# Patient Record
Sex: FEMALE | Race: WHITE | Employment: OTHER | URBAN - NONMETROPOLITAN AREA
[De-identification: names, ages, dates, MRNs, and addresses within clinical notes are randomized per-mention and may not be internally consistent; named-entity substitution may affect disease eponyms.]

---

## 2017-05-01 ENCOUNTER — HOSPITAL ENCOUNTER (OUTPATIENT)
Age: 82
Setting detail: SPECIMEN
Discharge: HOME | End: 2017-05-01
Payer: MEDICARE

## 2017-05-01 LAB — TSH SERPL DL<=0.05 MIU/L-ACNC: 1.13 MIU/L (ref 0.3–5)

## 2017-05-01 PROCEDURE — 84443 ASSAY THYROID STIM HORMONE: CPT

## 2017-05-01 PROCEDURE — 36415 COLL VENOUS BLD VENIPUNCTURE: CPT

## 2017-10-21 ENCOUNTER — APPOINTMENT (OUTPATIENT)
Dept: GENERAL RADIOLOGY | Age: 82
DRG: 872 | End: 2017-10-21
Payer: MEDICARE

## 2017-10-21 ENCOUNTER — HOSPITAL ENCOUNTER (INPATIENT)
Age: 82
LOS: 3 days | Discharge: NURSING, REHAB OR LONG-TERM CARE | DRG: 872 | End: 2017-10-25
Attending: FAMILY MEDICINE | Admitting: INTERNAL MEDICINE
Payer: MEDICARE

## 2017-10-21 DIAGNOSIS — R73.9 HYPERGLYCEMIA: Primary | ICD-10-CM

## 2017-10-21 LAB
-: ABNORMAL
ABSOLUTE EOS #: 0.1 K/UL (ref 0–0.4)
ABSOLUTE IMMATURE GRANULOCYTE: ABNORMAL K/UL (ref 0–0.3)
ABSOLUTE LYMPH #: 1.8 K/UL (ref 1–4.8)
ABSOLUTE MONO #: 0.4 K/UL (ref 0–1)
ALBUMIN SERPL-MCNC: 3.4 G/DL (ref 3.5–5.2)
ALBUMIN/GLOBULIN RATIO: ABNORMAL (ref 1–2.5)
ALP BLD-CCNC: 96 U/L (ref 35–104)
ALT SERPL-CCNC: 16 U/L (ref 5–33)
AMORPHOUS: ABNORMAL
AMYLASE: 52 U/L (ref 28–100)
ANION GAP SERPL CALCULATED.3IONS-SCNC: 13 MMOL/L (ref 9–17)
AST SERPL-CCNC: 12 U/L
BACTERIA: ABNORMAL
BASOPHILS # BLD: 0 %
BASOPHILS ABSOLUTE: 0 K/UL (ref 0–0.2)
BILIRUB SERPL-MCNC: 0.51 MG/DL (ref 0.3–1.2)
BILIRUBIN URINE: NEGATIVE
BUN BLDV-MCNC: 35 MG/DL (ref 8–23)
BUN/CREAT BLD: 21 (ref 9–20)
CALCIUM SERPL-MCNC: 9.7 MG/DL (ref 8.6–10.4)
CASTS UA: ABNORMAL /LPF
CHLORIDE BLD-SCNC: 93 MMOL/L (ref 98–107)
CO2: 26 MMOL/L (ref 20–31)
COLOR: YELLOW
COMMENT UA: ABNORMAL
CREAT SERPL-MCNC: 1.63 MG/DL (ref 0.5–0.9)
CRYSTALS, UA: ABNORMAL /HPF
DIFFERENTIAL TYPE: YES
EKG ATRIAL RATE: 113 BPM
EKG Q-T INTERVAL: 362 MS
EKG QRS DURATION: 74 MS
EKG QTC CALCULATION (BAZETT): 447 MS
EKG R AXIS: 27 DEGREES
EKG T AXIS: 65 DEGREES
EKG VENTRICULAR RATE: 92 BPM
EOSINOPHILS RELATIVE PERCENT: 1 %
EPITHELIAL CELLS UA: ABNORMAL /HPF
ESTIMATED AVERAGE GLUCOSE: 206 MG/DL
FIO2: 21
GFR AFRICAN AMERICAN: 36 ML/MIN
GFR NON-AFRICAN AMERICAN: 30 ML/MIN
GFR SERPL CREATININE-BSD FRML MDRD: ABNORMAL ML/MIN/{1.73_M2}
GFR SERPL CREATININE-BSD FRML MDRD: ABNORMAL ML/MIN/{1.73_M2}
GLUCOSE BLD-MCNC: 345 MG/DL (ref 65–99)
GLUCOSE BLD-MCNC: 395 MG/DL (ref 65–99)
GLUCOSE BLD-MCNC: 398 MG/DL (ref 65–99)
GLUCOSE BLD-MCNC: 569 MG/DL (ref 70–99)
GLUCOSE BLD-MCNC: 589 MG/DL
GLUCOSE URINE: ABNORMAL
HBA1C MFR BLD: 8.8 % (ref 4.8–5.9)
HCO3 VENOUS: 25.2 MMOL/L (ref 24–28)
HCT VFR BLD CALC: 35.5 % (ref 36–46)
HEMOGLOBIN: 11.8 G/DL (ref 12–16)
IMMATURE GRANULOCYTES: ABNORMAL %
INR BLD: 1
KETONES, URINE: NEGATIVE
LACTIC ACID, WHOLE BLOOD: ABNORMAL MMOL/L (ref 0.7–2.1)
LACTIC ACID, WHOLE BLOOD: ABNORMAL MMOL/L (ref 0.7–2.1)
LACTIC ACID: 2.9 MMOL/L (ref 0.5–2.2)
LACTIC ACID: 4.9 MMOL/L (ref 0.5–2.2)
LEUKOCYTE ESTERASE, URINE: ABNORMAL
LIPASE: 82 U/L (ref 13–60)
LYMPHOCYTES # BLD: 19 %
MAGNESIUM: 2.3 MG/DL (ref 1.6–2.6)
MCH RBC QN AUTO: 31.5 PG (ref 26–34)
MCHC RBC AUTO-ENTMCNC: 33.1 G/DL (ref 31–37)
MCV RBC AUTO: 95.3 FL (ref 80–100)
MONOCYTES # BLD: 4 %
MUCUS: ABNORMAL
NEGATIVE BASE EXCESS, VEN: ABNORMAL (ref 0–5)
NITRITE, URINE: NEGATIVE
O2 DEVICE/FLOW/%: ABNORMAL
O2 SAT, VEN: 37 % (ref 70–75)
OTHER OBSERVATIONS UA: ABNORMAL
PARTIAL THROMBOPLASTIN TIME: 26.6 SEC (ref 21–33)
PATIENT TEMP: ABNORMAL
PCO2, VEN: 44 MM HG (ref 41–51)
PDW BLD-RTO: 14.7 % (ref 12.1–15.2)
PH UA: 5 (ref 5–8)
PH VENOUS: 7.37 (ref 7.32–7.43)
PHOSPHORUS: 3.3 MG/DL (ref 2.6–4.5)
PLATELET # BLD: 281 K/UL (ref 140–450)
PLATELET ESTIMATE: ABNORMAL
PMV BLD AUTO: ABNORMAL FL (ref 6–12)
PO2, VEN: 23 MM HG (ref 25–40)
POC PCO2 TEMP: ABNORMAL MM HG
POC PH TEMP: ABNORMAL
POC PO2 TEMP: ABNORMAL MM HG
POSITIVE BASE EXCESS, VEN: 0 (ref 0–5)
POTASSIUM SERPL-SCNC: 5.4 MMOL/L (ref 3.7–5.3)
PROTEIN UA: ABNORMAL
PROTHROMBIN TIME: 9.8 SEC (ref 9–11.6)
RBC # BLD: 3.73 M/UL (ref 4–5.2)
RBC # BLD: ABNORMAL 10*6/UL
RBC UA: ABNORMAL /HPF (ref 0–2)
RENAL EPITHELIAL, UA: ABNORMAL /HPF
SEG NEUTROPHILS: 76 %
SEGMENTED NEUTROPHILS ABSOLUTE COUNT: 7.2 K/UL (ref 2.5–7)
SODIUM BLD-SCNC: 132 MMOL/L (ref 135–144)
SPECIFIC GRAVITY UA: 1.01 (ref 1–1.03)
TOTAL CK: 14 U/L (ref 26–192)
TOTAL CO2, VENOUS: 27 MMOL/L (ref 21–31)
TOTAL PROTEIN: 6.9 G/DL (ref 6.4–8.3)
TRICHOMONAS: ABNORMAL
TROPONIN INTERP: NORMAL
TROPONIN T: <0.03 NG/ML
TURBIDITY: ABNORMAL
URINE HGB: ABNORMAL
UROBILINOGEN, URINE: NORMAL
WBC # BLD: 9.6 K/UL (ref 3.5–11)
WBC # BLD: ABNORMAL 10*3/UL
WBC UA: ABNORMAL /HPF
YEAST: ABNORMAL

## 2017-10-21 PROCEDURE — 87040 BLOOD CULTURE FOR BACTERIA: CPT

## 2017-10-21 PROCEDURE — 83605 ASSAY OF LACTIC ACID: CPT

## 2017-10-21 PROCEDURE — 87077 CULTURE AEROBIC IDENTIFY: CPT

## 2017-10-21 PROCEDURE — 71010 XR CHEST PORTABLE: CPT

## 2017-10-21 PROCEDURE — 6360000002 HC RX W HCPCS: Performed by: FAMILY MEDICINE

## 2017-10-21 PROCEDURE — 82150 ASSAY OF AMYLASE: CPT

## 2017-10-21 PROCEDURE — 87086 URINE CULTURE/COLONY COUNT: CPT

## 2017-10-21 PROCEDURE — 36415 COLL VENOUS BLD VENIPUNCTURE: CPT

## 2017-10-21 PROCEDURE — 99285 EMERGENCY DEPT VISIT HI MDM: CPT

## 2017-10-21 PROCEDURE — 96375 TX/PRO/DX INJ NEW DRUG ADDON: CPT

## 2017-10-21 PROCEDURE — 83735 ASSAY OF MAGNESIUM: CPT

## 2017-10-21 PROCEDURE — 82803 BLOOD GASES ANY COMBINATION: CPT

## 2017-10-21 PROCEDURE — 84484 ASSAY OF TROPONIN QUANT: CPT

## 2017-10-21 PROCEDURE — 84100 ASSAY OF PHOSPHORUS: CPT

## 2017-10-21 PROCEDURE — 83036 HEMOGLOBIN GLYCOSYLATED A1C: CPT

## 2017-10-21 PROCEDURE — 85025 COMPLETE CBC W/AUTO DIFF WBC: CPT

## 2017-10-21 PROCEDURE — 85610 PROTHROMBIN TIME: CPT

## 2017-10-21 PROCEDURE — 85730 THROMBOPLASTIN TIME PARTIAL: CPT

## 2017-10-21 PROCEDURE — 6370000000 HC RX 637 (ALT 250 FOR IP): Performed by: INTERNAL MEDICINE

## 2017-10-21 PROCEDURE — 82550 ASSAY OF CK (CPK): CPT

## 2017-10-21 PROCEDURE — 93005 ELECTROCARDIOGRAM TRACING: CPT

## 2017-10-21 PROCEDURE — 81001 URINALYSIS AUTO W/SCOPE: CPT

## 2017-10-21 PROCEDURE — 2580000003 HC RX 258: Performed by: INTERNAL MEDICINE

## 2017-10-21 PROCEDURE — G0378 HOSPITAL OBSERVATION PER HR: HCPCS

## 2017-10-21 PROCEDURE — 96372 THER/PROPH/DIAG INJ SC/IM: CPT

## 2017-10-21 PROCEDURE — 87186 SC STD MICRODIL/AGAR DIL: CPT

## 2017-10-21 PROCEDURE — 6360000002 HC RX W HCPCS: Performed by: INTERNAL MEDICINE

## 2017-10-21 PROCEDURE — 82947 ASSAY GLUCOSE BLOOD QUANT: CPT

## 2017-10-21 PROCEDURE — 87205 SMEAR GRAM STAIN: CPT

## 2017-10-21 PROCEDURE — 2580000003 HC RX 258: Performed by: FAMILY MEDICINE

## 2017-10-21 PROCEDURE — 86403 PARTICLE AGGLUT ANTBDY SCRN: CPT

## 2017-10-21 PROCEDURE — 83690 ASSAY OF LIPASE: CPT

## 2017-10-21 PROCEDURE — 83930 ASSAY OF BLOOD OSMOLALITY: CPT

## 2017-10-21 PROCEDURE — 6370000000 HC RX 637 (ALT 250 FOR IP): Performed by: FAMILY MEDICINE

## 2017-10-21 PROCEDURE — 96365 THER/PROPH/DIAG IV INF INIT: CPT

## 2017-10-21 PROCEDURE — 80053 COMPREHEN METABOLIC PANEL: CPT

## 2017-10-21 PROCEDURE — 94760 N-INVAS EAR/PLS OXIMETRY 1: CPT

## 2017-10-21 RX ORDER — HYDROXYZINE HYDROCHLORIDE 25 MG/1
25 TABLET, FILM COATED ORAL EVERY 6 HOURS PRN
COMMUNITY

## 2017-10-21 RX ORDER — LISINOPRIL 10 MG/1
10 TABLET ORAL DAILY
COMMUNITY

## 2017-10-21 RX ORDER — ASPIRIN 81 MG/1
81 TABLET ORAL DAILY
Status: DISCONTINUED | OUTPATIENT
Start: 2017-10-21 | End: 2017-10-22 | Stop reason: SDUPTHER

## 2017-10-21 RX ORDER — NICOTINE POLACRILEX 4 MG
15 LOZENGE BUCCAL PRN
Status: DISCONTINUED | OUTPATIENT
Start: 2017-10-21 | End: 2017-10-25 | Stop reason: HOSPADM

## 2017-10-21 RX ORDER — LEVOTHYROXINE SODIUM 88 UG/1
88 TABLET ORAL DAILY
Status: DISCONTINUED | OUTPATIENT
Start: 2017-10-21 | End: 2017-10-25 | Stop reason: HOSPADM

## 2017-10-21 RX ORDER — FLUOXETINE 10 MG/1
10 CAPSULE ORAL DAILY
Status: DISCONTINUED | OUTPATIENT
Start: 2017-10-21 | End: 2017-10-22

## 2017-10-21 RX ORDER — CIPROFLOXACIN 2 MG/ML
400 INJECTION, SOLUTION INTRAVENOUS ONCE
Status: COMPLETED | OUTPATIENT
Start: 2017-10-21 | End: 2017-10-21

## 2017-10-21 RX ORDER — SODIUM CHLORIDE 9 MG/ML
INJECTION, SOLUTION INTRAVENOUS CONTINUOUS
Status: DISCONTINUED | OUTPATIENT
Start: 2017-10-21 | End: 2017-10-24

## 2017-10-21 RX ORDER — SODIUM CHLORIDE 0.9 % (FLUSH) 0.9 %
10 SYRINGE (ML) INJECTION PRN
Status: DISCONTINUED | OUTPATIENT
Start: 2017-10-21 | End: 2017-10-25 | Stop reason: HOSPADM

## 2017-10-21 RX ORDER — ACETAMINOPHEN 500 MG
1000 TABLET ORAL EVERY 8 HOURS PRN
COMMUNITY

## 2017-10-21 RX ORDER — DONEPEZIL HYDROCHLORIDE 23 MG/1
23 TABLET, FILM COATED ORAL NIGHTLY
Status: DISCONTINUED | OUTPATIENT
Start: 2017-10-21 | End: 2017-10-25 | Stop reason: HOSPADM

## 2017-10-21 RX ORDER — OXYBUTYNIN CHLORIDE 5 MG/1
5 TABLET ORAL NIGHTLY
Status: DISCONTINUED | OUTPATIENT
Start: 2017-10-21 | End: 2017-10-22 | Stop reason: SDUPTHER

## 2017-10-21 RX ORDER — DEXTROSE MONOHYDRATE 25 G/50ML
12.5 INJECTION, SOLUTION INTRAVENOUS PRN
Status: DISCONTINUED | OUTPATIENT
Start: 2017-10-21 | End: 2017-10-25 | Stop reason: HOSPADM

## 2017-10-21 RX ORDER — FAMOTIDINE 20 MG/1
20 TABLET, FILM COATED ORAL DAILY
Status: DISCONTINUED | OUTPATIENT
Start: 2017-10-21 | End: 2017-10-25 | Stop reason: HOSPADM

## 2017-10-21 RX ORDER — FERROUS SULFATE 325(65) MG
325 TABLET ORAL
Status: DISCONTINUED | OUTPATIENT
Start: 2017-10-22 | End: 2017-10-25 | Stop reason: HOSPADM

## 2017-10-21 RX ORDER — BISACODYL 10 MG
10 SUPPOSITORY, RECTAL RECTAL DAILY PRN
Status: ON HOLD | COMMUNITY
End: 2017-11-11

## 2017-10-21 RX ORDER — 0.9 % SODIUM CHLORIDE 0.9 %
250 INTRAVENOUS SOLUTION INTRAVENOUS ONCE
Status: DISCONTINUED | OUTPATIENT
Start: 2017-10-22 | End: 2017-10-25 | Stop reason: HOSPADM

## 2017-10-21 RX ORDER — ALBUTEROL SULFATE 2.5 MG/3ML
2.5 SOLUTION RESPIRATORY (INHALATION) EVERY 6 HOURS PRN
Status: DISCONTINUED | OUTPATIENT
Start: 2017-10-21 | End: 2017-10-25 | Stop reason: HOSPADM

## 2017-10-21 RX ORDER — ONDANSETRON 2 MG/ML
4 INJECTION INTRAMUSCULAR; INTRAVENOUS EVERY 6 HOURS PRN
Status: DISCONTINUED | OUTPATIENT
Start: 2017-10-21 | End: 2017-10-25 | Stop reason: HOSPADM

## 2017-10-21 RX ORDER — MEMANTINE HYDROCHLORIDE 10 MG/1
10 TABLET ORAL 2 TIMES DAILY
Status: DISCONTINUED | OUTPATIENT
Start: 2017-10-21 | End: 2017-10-25 | Stop reason: HOSPADM

## 2017-10-21 RX ORDER — SODIUM PHOSPHATE, DIBASIC AND SODIUM PHOSPHATE, MONOBASIC 7; 19 G/133ML; G/133ML
1 ENEMA RECTAL DAILY PRN
COMMUNITY

## 2017-10-21 RX ORDER — SODIUM CHLORIDE 0.9 % (FLUSH) 0.9 %
10 SYRINGE (ML) INJECTION EVERY 12 HOURS SCHEDULED
Status: DISCONTINUED | OUTPATIENT
Start: 2017-10-21 | End: 2017-10-25 | Stop reason: HOSPADM

## 2017-10-21 RX ORDER — DEXTROSE MONOHYDRATE 50 MG/ML
100 INJECTION, SOLUTION INTRAVENOUS PRN
Status: DISCONTINUED | OUTPATIENT
Start: 2017-10-21 | End: 2017-10-25 | Stop reason: HOSPADM

## 2017-10-21 RX ORDER — GUAIFENESIN 100 MG/5ML
10 SOLUTION ORAL EVERY 4 HOURS PRN
COMMUNITY

## 2017-10-21 RX ORDER — ACETAMINOPHEN 325 MG/1
650 TABLET ORAL EVERY 4 HOURS PRN
Status: DISCONTINUED | OUTPATIENT
Start: 2017-10-21 | End: 2017-10-22

## 2017-10-21 RX ORDER — BISACODYL 10 MG
10 SUPPOSITORY, RECTAL RECTAL DAILY PRN
Status: DISCONTINUED | OUTPATIENT
Start: 2017-10-21 | End: 2017-10-25 | Stop reason: HOSPADM

## 2017-10-21 RX ORDER — 0.9 % SODIUM CHLORIDE 0.9 %
1000 INTRAVENOUS SOLUTION INTRAVENOUS ONCE
Status: COMPLETED | OUTPATIENT
Start: 2017-10-21 | End: 2017-10-21

## 2017-10-21 RX ORDER — BACLOFEN 10 MG/1
5 TABLET ORAL 3 TIMES DAILY
Status: DISCONTINUED | OUTPATIENT
Start: 2017-10-21 | End: 2017-10-25 | Stop reason: HOSPADM

## 2017-10-21 RX ORDER — SODIUM CHLORIDE AND POTASSIUM CHLORIDE .9; .15 G/100ML; G/100ML
SOLUTION INTRAVENOUS ONCE
Status: COMPLETED | OUTPATIENT
Start: 2017-10-21 | End: 2017-10-21

## 2017-10-21 RX ORDER — DOCUSATE SODIUM 100 MG/1
100 CAPSULE, LIQUID FILLED ORAL 2 TIMES DAILY
Status: DISCONTINUED | OUTPATIENT
Start: 2017-10-21 | End: 2017-10-25 | Stop reason: HOSPADM

## 2017-10-21 RX ORDER — AMLODIPINE BESYLATE 5 MG/1
5 TABLET ORAL DAILY
COMMUNITY

## 2017-10-21 RX ORDER — OYSTER SHELL CALCIUM WITH VITAMIN D 500; 200 MG/1; [IU]/1
1 TABLET, FILM COATED ORAL DAILY
Status: DISCONTINUED | OUTPATIENT
Start: 2017-10-21 | End: 2017-10-22 | Stop reason: SDUPTHER

## 2017-10-21 RX ORDER — SIMETHICONE 80 MG
80 TABLET,CHEWABLE ORAL 2 TIMES DAILY PRN
COMMUNITY
End: 2017-11-11 | Stop reason: ALTCHOICE

## 2017-10-21 RX ADMIN — SODIUM CHLORIDE 1000 ML: 9 INJECTION, SOLUTION INTRAVENOUS at 17:42

## 2017-10-21 RX ADMIN — POTASSIUM CHLORIDE AND SODIUM CHLORIDE: 900; 150 INJECTION, SOLUTION INTRAVENOUS at 18:22

## 2017-10-21 RX ADMIN — OXYBUTYNIN CHLORIDE 5 MG: 5 TABLET ORAL at 21:50

## 2017-10-21 RX ADMIN — CIPROFLOXACIN 400 MG: 2 INJECTION, SOLUTION INTRAVENOUS at 18:07

## 2017-10-21 RX ADMIN — INSULIN GLARGINE 15 UNITS: 100 INJECTION, SOLUTION SUBCUTANEOUS at 21:59

## 2017-10-21 RX ADMIN — CEFTRIAXONE 1 G: 1 INJECTION, POWDER, FOR SOLUTION INTRAMUSCULAR; INTRAVENOUS at 21:42

## 2017-10-21 RX ADMIN — FOLIC ACID-PYRIDOXINE-CYANOCOBALAMIN TAB 2.5-25-2 MG 1 TABLET: 2.5-25-2 TAB at 21:51

## 2017-10-21 RX ADMIN — INSULIN LISPRO 5 UNITS: 100 INJECTION, SOLUTION INTRAVENOUS; SUBCUTANEOUS at 21:58

## 2017-10-21 RX ADMIN — INSULIN HUMAN 10 UNITS: 100 INJECTION, SOLUTION PARENTERAL at 17:42

## 2017-10-21 RX ADMIN — ENOXAPARIN SODIUM 30 MG: 30 INJECTION SUBCUTANEOUS at 21:41

## 2017-10-21 RX ADMIN — SODIUM CHLORIDE: 9 INJECTION, SOLUTION INTRAVENOUS at 20:48

## 2017-10-21 RX ADMIN — DONEPEZIL HYDROCHLORIDE 23 MG: 23 TABLET, FILM COATED ORAL at 21:49

## 2017-10-21 RX ADMIN — BACLOFEN 5 MG: 10 TABLET ORAL at 21:48

## 2017-10-21 RX ADMIN — DOCUSATE SODIUM 100 MG: 100 CAPSULE, LIQUID FILLED ORAL at 21:49

## 2017-10-21 ASSESSMENT — PAIN DESCRIPTION - PAIN TYPE: TYPE: CHRONIC PAIN

## 2017-10-21 ASSESSMENT — PAIN SCALES - GENERAL
PAINLEVEL_OUTOF10: 3
PAINLEVEL_OUTOF10: 0

## 2017-10-21 ASSESSMENT — PAIN DESCRIPTION - FREQUENCY: FREQUENCY: INTERMITTENT

## 2017-10-21 ASSESSMENT — PAIN DESCRIPTION - LOCATION: LOCATION: SHOULDER

## 2017-10-21 ASSESSMENT — PAIN DESCRIPTION - DESCRIPTORS: DESCRIPTORS: ACHING

## 2017-10-21 ASSESSMENT — PAIN DESCRIPTION - ORIENTATION: ORIENTATION: RIGHT;LEFT

## 2017-10-22 LAB
ANION GAP SERPL CALCULATED.3IONS-SCNC: 9 MMOL/L (ref 9–17)
BUN BLDV-MCNC: 24 MG/DL (ref 8–23)
BUN/CREAT BLD: 20 (ref 9–20)
CALCIUM SERPL-MCNC: 8.4 MG/DL (ref 8.6–10.4)
CHLORIDE BLD-SCNC: 102 MMOL/L (ref 98–107)
CO2: 24 MMOL/L (ref 20–31)
CREAT SERPL-MCNC: 1.22 MG/DL (ref 0.5–0.9)
GFR AFRICAN AMERICAN: 50 ML/MIN
GFR NON-AFRICAN AMERICAN: 41 ML/MIN
GFR SERPL CREATININE-BSD FRML MDRD: ABNORMAL ML/MIN/{1.73_M2}
GFR SERPL CREATININE-BSD FRML MDRD: ABNORMAL ML/MIN/{1.73_M2}
GLUCOSE BLD-MCNC: 211 MG/DL (ref 65–99)
GLUCOSE BLD-MCNC: 224 MG/DL (ref 65–99)
GLUCOSE BLD-MCNC: 283 MG/DL (ref 65–99)
GLUCOSE BLD-MCNC: 305 MG/DL (ref 65–99)
GLUCOSE BLD-MCNC: 311 MG/DL (ref 70–99)
HCT VFR BLD CALC: 32.7 % (ref 36–46)
HEMOGLOBIN: 10.5 G/DL (ref 12–16)
LACTIC ACID, WHOLE BLOOD: NORMAL MMOL/L (ref 0.7–2.1)
LACTIC ACID: 2 MMOL/L (ref 0.5–2.2)
MAGNESIUM: 1.9 MG/DL (ref 1.6–2.6)
MCH RBC QN AUTO: 30.9 PG (ref 26–34)
MCHC RBC AUTO-ENTMCNC: 32.3 G/DL (ref 31–37)
MCV RBC AUTO: 95.7 FL (ref 80–100)
PDW BLD-RTO: 15.2 % (ref 12.1–15.2)
PLATELET # BLD: 231 K/UL (ref 140–450)
PMV BLD AUTO: ABNORMAL FL (ref 6–12)
POTASSIUM SERPL-SCNC: 4.8 MMOL/L (ref 3.7–5.3)
RBC # BLD: 3.41 M/UL (ref 4–5.2)
SERUM OSMOLALITY: 324 MOSM/KG (ref 275–295)
SODIUM BLD-SCNC: 135 MMOL/L (ref 135–144)
WBC # BLD: 8.3 K/UL (ref 3.5–11)

## 2017-10-22 PROCEDURE — 82947 ASSAY GLUCOSE BLOOD QUANT: CPT

## 2017-10-22 PROCEDURE — 94761 N-INVAS EAR/PLS OXIMETRY MLT: CPT

## 2017-10-22 PROCEDURE — 6370000000 HC RX 637 (ALT 250 FOR IP): Performed by: INTERNAL MEDICINE

## 2017-10-22 PROCEDURE — 1200000000 HC SEMI PRIVATE

## 2017-10-22 PROCEDURE — 6360000002 HC RX W HCPCS: Performed by: INTERNAL MEDICINE

## 2017-10-22 PROCEDURE — 96372 THER/PROPH/DIAG INJ SC/IM: CPT

## 2017-10-22 PROCEDURE — 85027 COMPLETE CBC AUTOMATED: CPT

## 2017-10-22 PROCEDURE — 80048 BASIC METABOLIC PNL TOTAL CA: CPT

## 2017-10-22 PROCEDURE — 2580000003 HC RX 258: Performed by: INTERNAL MEDICINE

## 2017-10-22 PROCEDURE — 83735 ASSAY OF MAGNESIUM: CPT

## 2017-10-22 PROCEDURE — 36415 COLL VENOUS BLD VENIPUNCTURE: CPT

## 2017-10-22 PROCEDURE — 83605 ASSAY OF LACTIC ACID: CPT

## 2017-10-22 RX ORDER — OXYBUTYNIN CHLORIDE 5 MG/1
5 TABLET, EXTENDED RELEASE ORAL NIGHTLY
Status: DISCONTINUED | OUTPATIENT
Start: 2017-10-22 | End: 2017-10-25 | Stop reason: HOSPADM

## 2017-10-22 RX ORDER — INSULIN GLARGINE 100 [IU]/ML
15 INJECTION, SOLUTION SUBCUTANEOUS NIGHTLY
Status: DISCONTINUED | OUTPATIENT
Start: 2017-10-22 | End: 2017-10-22

## 2017-10-22 RX ORDER — ACETAMINOPHEN 500 MG
500 TABLET ORAL EVERY 4 HOURS PRN
Status: DISCONTINUED | OUTPATIENT
Start: 2017-10-22 | End: 2017-10-25 | Stop reason: HOSPADM

## 2017-10-22 RX ORDER — INSULIN GLARGINE 100 [IU]/ML
20 INJECTION, SOLUTION SUBCUTANEOUS NIGHTLY
Status: DISCONTINUED | OUTPATIENT
Start: 2017-10-22 | End: 2017-10-22

## 2017-10-22 RX ORDER — OXYBUTYNIN CHLORIDE 5 MG/1
5 TABLET, EXTENDED RELEASE ORAL NIGHTLY
COMMUNITY

## 2017-10-22 RX ORDER — ASPIRIN 81 MG/1
81 TABLET, CHEWABLE ORAL DAILY
Status: DISCONTINUED | OUTPATIENT
Start: 2017-10-22 | End: 2017-10-25 | Stop reason: HOSPADM

## 2017-10-22 RX ADMIN — MEMANTINE HYDROCHLORIDE 10 MG: 10 TABLET ORAL at 11:18

## 2017-10-22 RX ADMIN — BACLOFEN 5 MG: 10 TABLET ORAL at 13:55

## 2017-10-22 RX ADMIN — DOCUSATE SODIUM 100 MG: 100 CAPSULE, LIQUID FILLED ORAL at 20:53

## 2017-10-22 RX ADMIN — INSULIN LISPRO 6 UNITS: 100 INJECTION, SOLUTION INTRAVENOUS; SUBCUTANEOUS at 08:13

## 2017-10-22 RX ADMIN — FERROUS SULFATE TAB 325 MG (65 MG ELEMENTAL FE) 325 MG: 325 (65 FE) TAB at 10:04

## 2017-10-22 RX ADMIN — DONEPEZIL HYDROCHLORIDE 23 MG: 23 TABLET, FILM COATED ORAL at 20:53

## 2017-10-22 RX ADMIN — MEMANTINE HYDROCHLORIDE 10 MG: 10 TABLET ORAL at 20:53

## 2017-10-22 RX ADMIN — INSULIN LISPRO 4 UNITS: 100 INJECTION, SOLUTION INTRAVENOUS; SUBCUTANEOUS at 12:14

## 2017-10-22 RX ADMIN — OXYBUTYNIN CHLORIDE 5 MG: 5 TABLET, EXTENDED RELEASE ORAL at 20:53

## 2017-10-22 RX ADMIN — Medication 10 ML: at 21:00

## 2017-10-22 RX ADMIN — CEFTRIAXONE 1 G: 1 INJECTION, POWDER, FOR SOLUTION INTRAMUSCULAR; INTRAVENOUS at 20:53

## 2017-10-22 RX ADMIN — INSULIN LISPRO 4 UNITS: 100 INJECTION, SOLUTION INTRAVENOUS; SUBCUTANEOUS at 20:54

## 2017-10-22 RX ADMIN — DOCUSATE SODIUM 100 MG: 100 CAPSULE, LIQUID FILLED ORAL at 10:04

## 2017-10-22 RX ADMIN — BACLOFEN 5 MG: 10 TABLET ORAL at 20:53

## 2017-10-22 RX ADMIN — BACLOFEN 5 MG: 10 TABLET ORAL at 10:05

## 2017-10-22 RX ADMIN — ASPIRIN 81 MG 81 MG: 81 TABLET ORAL at 10:02

## 2017-10-22 RX ADMIN — ENOXAPARIN SODIUM 30 MG: 30 INJECTION SUBCUTANEOUS at 10:51

## 2017-10-22 RX ADMIN — LEVOTHYROXINE SODIUM 88 MCG: 88 TABLET ORAL at 10:02

## 2017-10-22 RX ADMIN — SODIUM CHLORIDE: 9 INJECTION, SOLUTION INTRAVENOUS at 15:11

## 2017-10-22 RX ADMIN — INSULIN LISPRO 4 UNITS: 100 INJECTION, SOLUTION INTRAVENOUS; SUBCUTANEOUS at 17:03

## 2017-10-22 RX ADMIN — FAMOTIDINE 20 MG: 20 TABLET ORAL at 10:02

## 2017-10-22 RX ADMIN — SODIUM CHLORIDE: 9 INJECTION, SOLUTION INTRAVENOUS at 04:54

## 2017-10-22 NOTE — PLAN OF CARE
Problem: Risk for Impaired Skin Integrity  Goal: Tissue integrity - skin and mucous membranes  Structural intactness and normal physiological function of skin and  mucous membranes. Outcome: Met This Shift  Skin intact. Coccyx slightly reddened. Pt kept off coccyx.

## 2017-10-22 NOTE — PROGRESS NOTES
Dr David Aguirre called for low blood pressures of 86/  39 and 89/41 automatic and 86/56 manual.  Order received for 250 ml over next 30 minutes.

## 2017-10-22 NOTE — H&P
History & Physical    Patient:  Olegario Fraire  YOB: 1925  Date of Service: 10/22/2017  MRN: [id number]   Acct:   [de-identified]   Primary Care Physician: Molly Mcneill MD    Chief Complaint:   Chief Complaint   Patient presents with    Hyperglycemia     not feeling well, sweating, shaky, incoherent per Pristine; not herself; FSBS read high, given 10 units novolog-still read high, then 5 units lantus, then 10 units novolog and still read high       History of Present Illness: The patient is a 80 y.o. female with multiple medical problems, resides at nursing home for the past 6-7 years, was brought to the emergency room for evaluation of decreased level of consciousness, shakes, poor oral intake, vomiting for the past 3 days and also higher level of glucose. The patient has a history of dementia, she is very confused and does not participate in interview. When asked about pain, nausea and shortness of breath, she nods her head \"no\". Her daughter Cheryl Belcher is present in the room and provided me with history. She states that the patient usually communicates with family members adequately. Last several days the patient was refusing food. On Thursday morning she vomited once and didn't eat much. The patient did not complain of any pain. She continued with poor oral intake and episodes of once or twice a day vomiting on Friday and Saturday. She became more somnolent and yesterday also developed shakes. Cheryl Belcher asked the nursing home staff to check on the monitor. She says blood pressure was normal, fingerstick glucose level was high and that prompted emergency room evaluation. The patient initially had stable vitals in the ER. She was afebrile, oxygenation was 98% on room air. Further workup revealed evidence of hyperglycemia with glucose level 569, urinalysis was consistent with UTI, chemistry panel revealed acute renal failure with BUN 35 and creatinine 1.63.   The patient had a sodium level of 132, potassium 5.4. She also had elevated lactic acid level 4.9. CBC revealed no leukocytosis. EKG revealed A. fib with heart rate 92. The patient was admitted for IV antibiotics and IV fluids. Overnight her blood pressure dropped and she was given normal saline 250 mL bolus. This morning her blood pressure is more stable. Hyperglycemia also improved after receiving IV insulin in ER. She had no episodes of vomiting since admission       Past Medical History:        Diagnosis Date    Anemia     [facility]ch[facility] obstructive [facility] disease) ([facility]     Dementia     Depression     Diabetes mellitus (HCC)     with neuropathy    Elevated myoglobin level     Hyperlipidemia     Hypertension     Leukocytosis     Occipital fracture ([facility] 06/2013    Skull fracture (HCC)     Thyroid disease     UTI (urinary tract infection)        Past Surgical History:        Procedure Laterality Date    HYSTERECTOMY      JOINT REPLACEMENT Left     knee    KNEE SURGERY      LUNG REMOVAL, PARTIAL         Home Medications:   No current facility-administered medications on file prior to encounter.       Current Outpatient Prescriptions on File Prior to Encounter   Medication Sig Dispense Refill    Calcium Carb-Cholecalciferol (CALCIUM-VITAMIN D) 600-400 MG-UNIT TABS Take 1 tablet by mouth daily      insulin aspart (NOVOLOG) 100 UNIT/ML injection vial Inject 5 Units into the skin 3 times daily (before meals)       docusate sodium (COLACE) 100 MG capsule Take 100 mg by mouth 2 times daily       Donepezil HCl (ARICEPT) 23 MG TABS tablet Take 23 mg by mouth nightly      ferrous sulfate 325 (65 FE) MG tablet Take 325 mg by mouth daily (with breakfast)      trimethoprim (TRIMPEX) 100 MG tablet Take 100 mg by mouth nightly       baclofen (LIORESAL) 10 MG tablet Take 5 mg by mouth 3 times daily      memantine (NAMENDA) 10 MG tablet Take 10 mg by mouth 2 times daily      aspirin 81 MG tablet Take 81 mg by droop noted, able to move extremities without difficulties    Review of Labs and Diagnostic Testing:    Recent Results (from the past 24 hour(s))   POCT glucose    Collection Time: 10/21/17  4:49 PM   Result Value Ref Range    Glucose 589 mg/dL   EKG 12 Lead    Collection Time: 10/21/17  5:11 PM   Result Value Ref Range    Ventricular Rate 92 BPM    Atrial Rate 113 BPM    QRS Duration 74 ms    Q-T Interval 362 ms    QTc Calculation (Bazett) 447 ms    R Axis 27 degrees    T Axis 65 degrees   APTT    Collection Time: 10/21/17  5:35 PM   Result Value Ref Range    PTT 26.6 21.0 - 33.0 sec   CBC Auto Differential    Collection Time: 10/21/17  5:35 PM   Result Value Ref Range    WBC 9.6 3.5 - 11.0 k/uL    RBC 3.73 (L) 4.0 - 5.2 m/uL    Hemoglobin 11.8 (L) 12.0 - 16.0 g/dL    Hematocrit 35.5 (L) 36 - 46 %    MCV 95.3 80 - 100 fL    MCH 31.5 26 - 34 pg    MCHC 33.1 31 - 37 g/dL    RDW 14.7 12.1 - 15.2 %    Platelets 321 478 - 933 k/uL    MPV NOT REPORTED 6.0 - 12.0 fL    Differential Type YES     Immature Granulocytes NOT REPORTED 0 %    Absolute Immature Granulocyte NOT REPORTED 0.00 - 0.30 k/uL    WBC Morphology NOT REPORTED     RBC Morphology NOT REPORTED     Platelet Estimate NOT REPORTED     Seg Neutrophils 76 %    Lymphocytes 19 %    Monocytes 4 %    Eosinophils % 1 %    Basophils 0 %    Segs Absolute 7.20 (H) 2.5 - 7.0 k/uL    Absolute Lymph # 1.80 1.0 - 4.8 k/uL    Absolute Mono # 0.40 0.0 - 1.0 k/uL    Absolute Eos # 0.10 0.0 - 0.4 k/uL    Basophils # 0.00 0.0 - 0.2 k/uL   Comprehensive Metabolic Panel    Collection Time: 10/21/17  5:35 PM   Result Value Ref Range    Glucose 569 (HH) 70 - 99 mg/dL    BUN 35 (H) 8 - 23 mg/dL    CREATININE 1.63 (H) 0.50 - 0.90 mg/dL    Bun/Cre Ratio 21 (H) 9 - 20    Calcium 9.7 8.6 - 10.4 mg/dL    Sodium 132 (L) 135 - 144 mmol/L    Potassium 5.4 (H) 3.7 - 5.3 mmol/L    Chloride 93 (L) 98 - 107 mmol/L    CO2 26 20 - 31 mmol/L    Anion Gap 13 9 - 17 mmol/L    Alkaline Phosphatase 96 35 - 104 U/L    ALT 16 5 - 33 U/L    AST 12 <32 U/L    Total Bilirubin 0.51 0.3 - 1.2 mg/dL    Total Protein 6.9 6.4 - 8.3 g/dL    Alb 3.4 (L) 3.5 - 5.2 g/dL    Albumin/Globulin Ratio NOT REPORTED 1.0 - 2.5    GFR Non-African American 30 (L) >60 mL/min    GFR  36 (L) >60 mL/min    GFR Comment          GFR Staging NOT REPORTED    Protime-INR    Collection Time: 10/21/17  5:35 PM   Result Value Ref Range    Protime 9.8 9.0 - 11.6 sec    INR 1.0    Amylase    Collection Time: 10/21/17  5:35 PM   Result Value Ref Range    Amylase 52 28 - 100 U/L   Lipase    Collection Time: 10/21/17  5:35 PM   Result Value Ref Range    Lipase 82 (H) 13 - 60 U/L   Urinalysis    Collection Time: 10/21/17  5:35 PM   Result Value Ref Range    Color, UA YELLOW YEL    Turbidity UA CLOUDY (A) CLEAR    Glucose, Ur 1000 mg/dL (A) NEG    Bilirubin Urine NEGATIVE NEG    Ketones, Urine NEGATIVE NEG    Specific Gravity, UA 1.010 1.005 - 1.030    Urine Hgb 3+ (A) NEG    pH, UA 5.0 5.0 - 8.0    Protein, UA 1+ (A) NEG    Urobilinogen, Urine Normal NORM    Nitrite, Urine NEGATIVE NEG    Leukocyte Esterase, Urine 3+ (A) NEG    Urinalysis Comments         Magnesium    Collection Time: 10/21/17  5:35 PM   Result Value Ref Range    Magnesium 2.3 1.6 - 2.6 mg/dL   Phosphorus    Collection Time: 10/21/17  5:35 PM   Result Value Ref Range    Phosphorus 3.3 2.6 - 4.5 mg/dL   Culture blood #1    Collection Time: 10/21/17  5:35 PM   Result Value Ref Range    Specimen Description . BLOOD     Special Requests NOT REPORTED     Culture NO GROWTH 13 HOURS     Culture       Performed at [facility] [id number] [place], [place]    Culture  ([phone]     Status Pending    Lactic acid, plasma    Collection Time: 10/21/17  5:35 PM   Result Value Ref Range    Lactic Acid 4.9 (H) 0.5 - 2.2 mmol/L    Lactic Acid, Whole Blood NOT REPORTED 0.7 - 2.1 mmol/L   Microscopic Urinalysis    Collection Time: 10/21/17  5:35 PM   Result Value Ref Range    -          WBC, UA LOADED 0 /HPF    RBC, UA NOT REPORTED 0 - 2 /HPF    Casts UA NOT REPORTED /LPF    Crystals UA NOT REPORTED NONE /HPF    Epithelial Cells UA NOT REPORTED /HPF    Renal Epithelial, Urine NOT REPORTED 0 /HPF    Bacteria, UA 3+ (A) NONE    Mucus, UA NOT REPORTED NONE    Trichomonas, UA NOT REPORTED NONE    Amorphous, UA NOT REPORTED NONE    Other Observations UA NOT REPORTED NREQ    Yeast, UA NOT REPORTED NONE   CK    Collection Time: 10/21/17  5:36 PM   Result Value Ref Range    Total CK 14 (L) 26 - 192 U/L   Troponin    Collection Time: 10/21/17  5:36 PM   Result Value Ref Range    Troponin T <0.03 <0.03 ng/mL    Troponin Interp         POC PANEL (G3)-EM    Collection Time: 10/21/17  5:58 PM   Result Value Ref Range    pH, Em 7.37 7.32 - 7.43    pCO2, Em 44 41 - 51 mm Hg    pO2, Em 23 (LL) 25 - 40 mm Hg    Total CO2, Venous 27 21 - 31 mmol/L    HCO3, Venous 25.2 24 - 28 mmol/L    Negative Base Excess, Em NOT REPORTED 0.0 - 5.0    Positive Base Excess, Em 0 0 - 5.0    O2 Sat, Em 37 (LL) 70 - 75 %    Pt Temp NOT REPORTED     O2 Device/Flow/% NOT REPORTED     FIO2 21.0     POC pH Temp NOT REPORTED     POC pCO2 Temp NOT REPORTED mm Hg    POC pO2 Temp NOT REPORTED mm Hg   Glucose, Whole Blood    Collection Time: 10/21/17  6:52 PM   Result Value Ref Range    POC Glucose 398 (H) 65 - 99 mg/dL   Glucose, Whole Blood    Collection Time: 10/21/17  8:08 PM   Result Value Ref Range    POC Glucose 345 (H) 65 - 99 mg/dL   Lactic Acid, Plasma    Collection Time: 10/21/17  9:09 PM   Result Value Ref Range    Lactic Acid 2.9 (H) 0.5 - 2.2 mmol/L    Lactic Acid, Whole Blood NOT REPORTED 0.7 - 2.1 mmol/L   Hemoglobin A1c    Collection Time: 10/21/17  9:09 PM   Result Value Ref Range    Hemoglobin A1C 8.8 (H) 4.8 - 5.9 %    Estimated Avg Glucose 206 mg/dL   Glucose, Whole Blood    Collection Time: 10/21/17  9:52 PM   Result Value Ref Range    POC Glucose 395 (H) 65 - 99 mg/dL   Basic metabolic panel

## 2017-10-22 NOTE — PROGRESS NOTES
Based on patients currect CrCl (21 ml/min) the recommended max dose of memantine is 5 mg twice daily.

## 2017-10-22 NOTE — ED PROVIDER NOTES
History Narrative    ** Merged History Encounter **            PHYSICAL EXAM    VITAL SIGNS: BP (!) 96/56   Pulse 70   Temp 98.6 °F (37 °C) (Oral)   Resp 18   Ht 5' 4\" (1.626 m)   Wt 163 lb 2 oz (74 kg)   SpO2 96%   BMI 28.00 kg/m²   Constitutional:  Patient appears lethargic. Eyes:  Normal  HENT:  Atraumatic, external ears normal, nose normal, oropharynx dry. . Neck- normal range of motion, no tenderness, supple. Respiratory:  No respiratory distress, normal breath sounds, no rales, no wheezing   Cardiovascular:  Normal rate, normal rhythm, no murmurs, no gallops, no rubs. Carotid exam Normal.  GI:  Soft, nondistended, normal bowel sounds, nontender, no organomegaly, no mass, no rebound, no guarding   Musculoskeletal:  No edema, no tenderness, no  deformities. Back- no tenderness   Integument:  Poor skin turgor, no rash   Neurologic:  Patient was oriented to person. Disoriented to time and place. Neuro exam was otherwise grossly intact. EKG    Atrial fibrillation. This is chronic. RADIOLOGY/PROCEDURES      Chest x-ray with no acute changes. ED COURSE & MEDICAL DECISION MAKING    Pertinent Labs & Imaging studies reviewed. (See chart for details)  Patient had a good response to treatment she received in the ER. She became more alert. I discussed patient with Dr. Jaswinder Montoya, and patient will be admitted for observation.    Labs Reviewed   CBC WITH AUTO DIFFERENTIAL - Abnormal; Notable for the following:        Result Value    RBC 3.73 (*)     Hemoglobin 11.8 (*)     Hematocrit 35.5 (*)     Segs Absolute 7.20 (*)     All other components within normal limits   COMPREHENSIVE METABOLIC PANEL - Abnormal; Notable for the following:     Glucose 569 (*)     BUN 35 (*)     CREATININE 1.63 (*)     Bun/Cre Ratio 21 (*)     Sodium 132 (*)     Potassium 5.4 (*)     Chloride 93 (*)     Alb 3.4 (*)     GFR Non- 30 (*)     GFR  36 (*)     All other components within normal limits LIPASE - Abnormal; Notable for the following:     Lipase 82 (*)     All other components within normal limits   URINALYSIS - Abnormal; Notable for the following:     Turbidity UA CLOUDY (*)     Glucose, Ur 1000 mg/dL (*)     Urine Hgb 3+ (*)     Protein, UA 1+ (*)     Leukocyte Esterase, Urine 3+ (*)     All other components within normal limits   LACTIC ACID, PLASMA - Abnormal; Notable for the following:     Lactic Acid 4.9 (*)     All other components within normal limits   CK - Abnormal; Notable for the following: Total CK 14 (*)     All other components within normal limits   MICROSCOPIC URINALYSIS - Abnormal; Notable for the following:     Bacteria, UA 3+ (*)     All other components within normal limits   POC PANEL (G3)-EM - Abnormal; Notable for the following:     pO2, Em 23 (*)     O2 Sat, Em 37 (*)     All other components within normal limits   GLUCOSE, WHOLE BLOOD - Abnormal; Notable for the following:     POC Glucose 398 (*)     All other components within normal limits   GLUCOSE, WHOLE BLOOD - Abnormal; Notable for the following:     POC Glucose 345 (*)     All other components within normal limits   LACTIC ACID, PLASMA - Abnormal; Notable for the following:     Lactic Acid 2.9 (*)     All other components within normal limits   HEMOGLOBIN A1C - Abnormal; Notable for the following:     Hemoglobin A1C 8.8 (*)     All other components within normal limits   GLUCOSE, WHOLE BLOOD - Abnormal; Notable for the following:     POC Glucose 395 (*)     All other components within normal limits   POCT GLUCOSE - Normal   CULTURE BLOOD #1   URINE CULTURE   APTT   PROTIME-INR   AMYLASE   MAGNESIUM   PHOSPHORUS   TROPONIN   OSMOLALITY   BASIC METABOLIC PANEL   MAGNESIUM   CBC   POCT GLUCOSE   POCT GLUCOSE       FINAL IMPRESSION    1. Hyperglycemia      Summation      Patient Course: Discussed with Dr. Manuela Ruiz. Patient will be admitted for observation.     ED Medications administered this visit: Medications   albuterol (PROVENTIL) nebulizer solution 2.5 mg (not administered)   aspirin EC tablet 81 mg (81 mg Oral Not Given 10/21/17 2132)   baclofen (LIORESAL) tablet 5 mg (5 mg Oral Given 10/21/17 2148)   bisacodyl (DULCOLAX) suppository 10 mg (not administered)   calcium-vitamin D (OSCAL-500) 500-200 MG-UNIT per tablet 1 tablet (1 tablet Oral Not Given 10/21/17 2133)   docusate sodium (COLACE) capsule 100 mg (100 mg Oral Given 10/21/17 2149)   Donepezil HCl (ARICEPT) tablet 23 mg (23 mg Oral Given 10/21/17 2149)   famotidine (PEPCID) tablet 20 mg (20 mg Oral Not Given 10/21/17 2134)   ferrous sulfate tablet 325 mg (not administered)   FLUoxetine (PROZAC) capsule 10 mg (10 mg Oral Not Given 91/23/31 2981)   folic acid-pyridoxine-cyancobalamin (FOLTX) 2.5-25-2 MG TABS 1 tablet (1 tablet Oral Given 10/21/17 2151)   insulin glargine (LANTUS) injection pen 15 Units (15 Units Subcutaneous Given 10/21/17 2159)   levothyroxine (SYNTHROID) tablet 88 mcg (88 mcg Oral Not Given 10/21/17 2136)   magnesium hydroxide (MILK OF MAGNESIA) 400 MG/5ML suspension 30 mL (not administered)   memantine (NAMENDA) tablet 10 mg (10 mg Oral Not Given 10/21/17 2137)   oxybutynin (DITROPAN) tablet 5 mg (5 mg Oral Given 10/21/17 2150)   cefTRIAXone (ROCEPHIN) 1 g in sterile water 10 mL IV syringe (1 g Intravenous New Bag 10/21/17 2142)   sodium chloride flush 0.9 % injection 10 mL (10 mLs Intravenous Not Given 10/21/17 2139)   sodium chloride flush 0.9 % injection 10 mL (not administered)   acetaminophen (TYLENOL) tablet 650 mg (not administered)   ondansetron (ZOFRAN) injection 4 mg (not administered)   enoxaparin (LOVENOX) injection 30 mg (30 mg Subcutaneous Given 10/21/17 2141)   0.9 % sodium chloride infusion ( Intravenous New Bag 10/21/17 2048)   glucose (GLUTOSE) 40 % oral gel 15 g (not administered)   dextrose 50 % solution 12.5 g (not administered)   glucagon (rDNA) injection 1 mg (not administered)   dextrose 5 % solution (not administered)   insulin lispro (HUMALOG) injection pen 0-12 Units (not administered)   insulin lispro (HUMALOG) injection pen 0-6 Units (5 Units Subcutaneous Given 10/21/17 2158)   0.9 % sodium chloride bolus ( Intravenous Canceled Entry 10/22/17 0044)   0.9 % sodium chloride bolus (0 mLs Intravenous Stopped 10/21/17 1823)   insulin regular (HUMULIN R;NOVOLIN R) injection 10 Units (10 Units Intravenous Given 10/21/17 1742)   0.9% NaCl with KCl 20 mEq infusion ( Intravenous New Bag 10/21/17 1822)   ciprofloxacin (CIPRO) IVPB 400 mg (0 mg Intravenous Stopped 10/21/17 1913)       New Prescriptions from this visit:    Current Discharge Medication List          Follow-up:  Jovon Sheridan MD  [address] [place] [address]              Final Impression:   1.  Hyperglycemia               (Please note that portions of this note were completed with a voice recognition program.  Efforts were made to edit the dictations but occasionally words are mis-transcribed.)           Gaetano Torres MD  10/22/17 1134

## 2017-10-22 NOTE — PLAN OF CARE
Problem: Risk for Impaired Skin Integrity  Goal: Tissue integrity - skin and mucous membranes  Structural intactness and normal physiological function of skin and  mucous membranes.    Outcome: Met This Shift

## 2017-10-22 NOTE — PROGRESS NOTES
Pt to floor via stretcher, dependent to bed. Alert and oriented to person only, incontinent of bowel and bladder. Answers yes/no questions, turns for repositioning.

## 2017-10-23 PROBLEM — E44.0 MODERATE MALNUTRITION (HCC): Status: ACTIVE | Noted: 2017-10-23

## 2017-10-23 LAB
ABSOLUTE EOS #: 0.2 K/UL (ref 0–0.4)
ABSOLUTE IMMATURE GRANULOCYTE: ABNORMAL K/UL (ref 0–0.3)
ABSOLUTE LYMPH #: 1.9 K/UL (ref 1–4.8)
ABSOLUTE MONO #: 0.7 K/UL (ref 0–1)
ANION GAP SERPL CALCULATED.3IONS-SCNC: 11 MMOL/L (ref 9–17)
BASOPHILS # BLD: 0 %
BASOPHILS ABSOLUTE: 0 K/UL (ref 0–0.2)
BUN BLDV-MCNC: 16 MG/DL (ref 8–23)
BUN/CREAT BLD: 19 (ref 9–20)
CALCIUM SERPL-MCNC: 8.5 MG/DL (ref 8.6–10.4)
CHLORIDE BLD-SCNC: 105 MMOL/L (ref 98–107)
CO2: 20 MMOL/L (ref 20–31)
CREAT SERPL-MCNC: 0.85 MG/DL (ref 0.5–0.9)
CULTURE: ABNORMAL
CULTURE: ABNORMAL
DIFFERENTIAL TYPE: YES
EOSINOPHILS RELATIVE PERCENT: 3 %
GFR AFRICAN AMERICAN: >60 ML/MIN
GFR NON-AFRICAN AMERICAN: >60 ML/MIN
GFR SERPL CREATININE-BSD FRML MDRD: ABNORMAL ML/MIN/{1.73_M2}
GFR SERPL CREATININE-BSD FRML MDRD: ABNORMAL ML/MIN/{1.73_M2}
GLUCOSE BLD-MCNC: 214 MG/DL (ref 70–99)
GLUCOSE BLD-MCNC: 217 MG/DL (ref 65–99)
GLUCOSE BLD-MCNC: 224 MG/DL (ref 65–99)
GLUCOSE BLD-MCNC: 268 MG/DL (ref 65–99)
GLUCOSE BLD-MCNC: 277 MG/DL (ref 65–99)
HCT VFR BLD CALC: 31.9 % (ref 36–46)
HEMOGLOBIN: 10.4 G/DL (ref 12–16)
IMMATURE GRANULOCYTES: ABNORMAL %
LV EF: 55 %
LVEF MODALITY: NORMAL
LYMPHOCYTES # BLD: 24 %
Lab: ABNORMAL
MAGNESIUM: 1.9 MG/DL (ref 1.6–2.6)
MCH RBC QN AUTO: 31 PG (ref 26–34)
MCHC RBC AUTO-ENTMCNC: 32.5 G/DL (ref 31–37)
MCV RBC AUTO: 95.1 FL (ref 80–100)
MONOCYTES # BLD: 9 %
PDW BLD-RTO: 14.7 % (ref 12.1–15.2)
PLATELET # BLD: 247 K/UL (ref 140–450)
PLATELET ESTIMATE: ABNORMAL
PMV BLD AUTO: ABNORMAL FL (ref 6–12)
POTASSIUM SERPL-SCNC: 4.5 MMOL/L (ref 3.7–5.3)
RBC # BLD: 3.35 M/UL (ref 4–5.2)
RBC # BLD: ABNORMAL 10*6/UL
SEG NEUTROPHILS: 64 %
SEGMENTED NEUTROPHILS ABSOLUTE COUNT: 4.9 K/UL (ref 2.5–7)
SODIUM BLD-SCNC: 136 MMOL/L (ref 135–144)
SPECIMEN DESCRIPTION: ABNORMAL
SPECIMEN DESCRIPTION: ABNORMAL
STATUS: ABNORMAL
WBC # BLD: 7.7 K/UL (ref 3.5–11)
WBC # BLD: ABNORMAL 10*3/UL

## 2017-10-23 PROCEDURE — 1200000000 HC SEMI PRIVATE

## 2017-10-23 PROCEDURE — 80048 BASIC METABOLIC PNL TOTAL CA: CPT

## 2017-10-23 PROCEDURE — 93306 TTE W/DOPPLER COMPLETE: CPT

## 2017-10-23 PROCEDURE — 82947 ASSAY GLUCOSE BLOOD QUANT: CPT

## 2017-10-23 PROCEDURE — 6370000000 HC RX 637 (ALT 250 FOR IP): Performed by: INTERNAL MEDICINE

## 2017-10-23 PROCEDURE — 6360000002 HC RX W HCPCS: Performed by: INTERNAL MEDICINE

## 2017-10-23 PROCEDURE — 85025 COMPLETE CBC W/AUTO DIFF WBC: CPT

## 2017-10-23 PROCEDURE — 94761 N-INVAS EAR/PLS OXIMETRY MLT: CPT

## 2017-10-23 PROCEDURE — 2580000003 HC RX 258: Performed by: INTERNAL MEDICINE

## 2017-10-23 PROCEDURE — 99222 1ST HOSP IP/OBS MODERATE 55: CPT | Performed by: INTERNAL MEDICINE

## 2017-10-23 PROCEDURE — 36415 COLL VENOUS BLD VENIPUNCTURE: CPT

## 2017-10-23 PROCEDURE — 83735 ASSAY OF MAGNESIUM: CPT

## 2017-10-23 RX ORDER — FLUCONAZOLE, SODIUM CHLORIDE 2 MG/ML
100 INJECTION INTRAVENOUS EVERY 24 HOURS
Status: DISCONTINUED | OUTPATIENT
Start: 2017-10-23 | End: 2017-10-25 | Stop reason: HOSPADM

## 2017-10-23 RX ADMIN — INSULIN LISPRO 6 UNITS: 100 INJECTION, SOLUTION INTRAVENOUS; SUBCUTANEOUS at 16:41

## 2017-10-23 RX ADMIN — BACLOFEN 5 MG: 10 TABLET ORAL at 11:33

## 2017-10-23 RX ADMIN — FLUCONAZOLE 100 MG: 2 INJECTION INTRAVENOUS at 05:26

## 2017-10-23 RX ADMIN — MEMANTINE HYDROCHLORIDE 10 MG: 10 TABLET ORAL at 22:13

## 2017-10-23 RX ADMIN — INSULIN LISPRO 3 UNITS: 100 INJECTION, SOLUTION INTRAVENOUS; SUBCUTANEOUS at 22:15

## 2017-10-23 RX ADMIN — FAMOTIDINE 20 MG: 20 TABLET ORAL at 09:37

## 2017-10-23 RX ADMIN — Medication 10 ML: at 22:16

## 2017-10-23 RX ADMIN — FERROUS SULFATE TAB 325 MG (65 MG ELEMENTAL FE) 325 MG: 325 (65 FE) TAB at 09:37

## 2017-10-23 RX ADMIN — BACLOFEN 5 MG: 10 TABLET ORAL at 16:40

## 2017-10-23 RX ADMIN — DONEPEZIL HYDROCHLORIDE 23 MG: 23 TABLET, FILM COATED ORAL at 22:14

## 2017-10-23 RX ADMIN — INSULIN LISPRO 4 UNITS: 100 INJECTION, SOLUTION INTRAVENOUS; SUBCUTANEOUS at 09:36

## 2017-10-23 RX ADMIN — OXYBUTYNIN CHLORIDE 5 MG: 5 TABLET, EXTENDED RELEASE ORAL at 22:13

## 2017-10-23 RX ADMIN — DOCUSATE SODIUM 100 MG: 100 CAPSULE, LIQUID FILLED ORAL at 09:37

## 2017-10-23 RX ADMIN — BACLOFEN 5 MG: 10 TABLET ORAL at 22:13

## 2017-10-23 RX ADMIN — DOCUSATE SODIUM 100 MG: 100 CAPSULE, LIQUID FILLED ORAL at 22:13

## 2017-10-23 RX ADMIN — ASPIRIN 81 MG 81 MG: 81 TABLET ORAL at 09:37

## 2017-10-23 RX ADMIN — SODIUM CHLORIDE: 9 INJECTION, SOLUTION INTRAVENOUS at 01:14

## 2017-10-23 RX ADMIN — CEFTRIAXONE 1 G: 1 INJECTION, POWDER, FOR SOLUTION INTRAMUSCULAR; INTRAVENOUS at 22:59

## 2017-10-23 RX ADMIN — VANCOMYCIN HYDROCHLORIDE 1000 MG: 1 INJECTION, POWDER, LYOPHILIZED, FOR SOLUTION INTRAVENOUS at 06:19

## 2017-10-23 RX ADMIN — INSULIN LISPRO 4 UNITS: 100 INJECTION, SOLUTION INTRAVENOUS; SUBCUTANEOUS at 11:36

## 2017-10-23 RX ADMIN — MEMANTINE HYDROCHLORIDE 10 MG: 10 TABLET ORAL at 09:37

## 2017-10-23 RX ADMIN — LEVOTHYROXINE SODIUM 88 MCG: 88 TABLET ORAL at 09:36

## 2017-10-23 ASSESSMENT — PAIN SCALES - GENERAL
PAINLEVEL_OUTOF10: 0

## 2017-10-23 NOTE — PROGRESS NOTES
Attends changed, urine is light pink and no clots noted at this time. Repositioned for comfort. Bed and personal safety alarm on. Call light within reach. Denies needs.

## 2017-10-23 NOTE — PROGRESS NOTES
Pt resting in bed awake. She is oriented to self only. Denies pain. Attends is changed of saturated amount of urine that is yellow / pink in color. Assessment completed. Call light within reach. Denies needs. Bed alarm on for safety.  Daughter here and assists with feeding of meal.

## 2017-10-23 NOTE — PROGRESS NOTES
Needs):  · Estimated Daily Total Kcal: 3522-9062  · Estimated Daily Protein (g): 71-77    Lab Results   Component Value Date     10/23/2017    K 4.5 10/23/2017     10/23/2017    CO2 20 10/23/2017    BUN 16 10/23/2017    CREATININE 0.85 10/23/2017    GLUCOSE 214 (H) 10/23/2017    CALCIUM 8.5 (L) 10/23/2017    PROT 6.9 10/21/2017    LABALBU 3.4 (L) 10/21/2017    BILITOT 0.51 10/21/2017    ALKPHOS 96 10/21/2017    AST 12 10/21/2017    ALT 16 10/21/2017    LABGLOM >60 10/23/2017    GFRAA >60 10/23/2017     Lab Results   Component Value Date    LABA1C 8.8 (H) 10/21/2017     Lab Results   Component Value Date     10/21/2017     Recent Labs      10/21/17   1852  10/21/17   2008  10/21/17   2152  10/22/17   0801  10/22/17   1153  10/22/17   1702  10/22/17   2051  10/23/17   0758   POCGLU  398*  345*  395*  283*  224*  211*  305*  224*       Estimated Intake vs Estimated Needs: Intake Improving    Nutrition Risk Level: High, Moderate    Improving PO intakes with hospitalization. Elevated glucose chronically, with excursions r/t infection. Weight currently at usual values, per historical data, and likely r/t iv provision. Taking Glucerna well.       Nutrition Interventions:   Continue current diet, Continue current ONS  Continued Inpatient Monitoring, Education Not Indicated, Coordination of Care    Nutrition Evaluation:   · Evaluation: Goals set   · Goals: PO>75% meals and supplements    · Monitoring: Meal Intake, Supplement Intake, Weight, Pertinent Labs    Electronically signed by Vivien Garza RD, LD on 10/23/17 at 8:28 AM    Contact Number: [phone]

## 2017-10-23 NOTE — PROGRESS NOTES
Cardiology    1. Probable chronic atrial fibrillation ( on EKG's from 2013 )  2. DM  3. Sepsis  4. Dementia  5. Hx of falling    Can obtain no history from her. Appears to have chronic atrial fibrillation. Rate is reasonably controlled. Not a candidate for anticoagulation. Will check echo. Cardiac status stable with no evidence of acute cardiac problem.     Thanks, Wyatt Mathis MD

## 2017-10-23 NOTE — CONSULTS
Valeda Opitz is a 80 y.o. female started on Vancomycin for positive blood culture; consult received from Dr. Thuan Santacruz Back to manage therapy. Also receiving the following antibiotics: ceftriaxone.         Patient Active Problem List   Diagnosis    [facility]) [facility]    Leukocytosis    [facility] of occiput[facility]    Elevated myoglobin level    Diabetes type 2, controlled ([facility]    Hypothyroidism    Hypertension    Urinary retention    Lower urinary tract infectious disease    Gross hematuria    Right flank pain    Hyperglycemia      Vancomycin Day: 2  Current Dosing: Vancomycin 1000 mg IV every 36 hours     Temp max:  98.6 F    Recent Labs      10/22/17   0636  10/23/17   0522   BUN  24*  16       Recent Labs      10/22/17   0636  10/23/17   0522   CREATININE  1.22*  0.85       Recent Labs      10/22/17   0636  10/23/17   0522   WBC  8.3  7.7         Intake/Output Summary (Last 24 hours) at 10/23/17 0901  Last data filed at 10/22/17 2310   Gross per 24 hour   Intake 3273 ml   Output 0 ml   Net 3273 ml       Culture Date      Source              Results  10/21/2017           Blood                Gram+ cocci in clusters   10/21/2017           Urine                 Aerococcus urinae> 1000,000 cfu/ml susceptible to PCN,                                                                      Amoxicillin, and Nitrofurantoin     Ht Readings from Last 1 Encounters:   10/23/17 5' 4\" (1.626 m)        Wt Readings from Last 1 Encounters:   10/22/17 161 lb 6 oz (73.2 kg)         Body mass index is 27.7 kg/m². Estimated Creatinine Clearance: 41 mL/min (based on SCr of 0.85 mg/dL). Assessment/Plan:  Since Sc has improved and DAVID is resolving, patient's calculated CrCl has improved to 41 ml/min. Will increase vancomycin to 1000 mg IV every 24 hours at this time and order a trough prior to the 3rd dose on 10-.      Iesha Rock, PharmD  24- at [address] [place] [address]

## 2017-10-23 NOTE — PROGRESS NOTES
Hospitalist Progress Note  10/23/2017 5:14 AM  Subjective:   Admit Date: 10/21/2017  PCP: Lorena Moe MD    Interval History: Mona Pedro has no complaints this morning. Nursing reports that her urine is blood tinged. Her blood culture is growing out a gm positive cocci in clusters. Urine culture is growing out yeast.  She denies chest pain or SOB. She feels her appetite is good, no nausea.   Labs pending this am.      Diet: DIET CARB CONTROL; Carb Control: 4 carbs/meal (approximate 1800 kcals/day)  Dietary Nutrition Supplements: Diabetic Oral Supplement  Medications:   Scheduled Meds:   fluconazole  100 mg Intravenous Q24H    aspirin  81 mg Oral Daily    calcium carbonate-vitamin D  1 tablet Oral Daily    Folinic Acid-Vit B6-Vit B12  1 tablet Oral BID    oxybutynin  5 mg Oral Nightly    insulin glargine  20 Units Subcutaneous Nightly    insulin lispro  0-12 Units Subcutaneous TID WC    insulin lispro  0-6 Units Subcutaneous Nightly    baclofen  5 mg Oral TID    docusate sodium  100 mg Oral BID    Donepezil HCl  23 mg Oral Nightly    famotidine  20 mg Oral Daily    ferrous sulfate  325 mg Oral Daily with breakfast    levothyroxine  88 mcg Oral Daily    memantine  10 mg Oral BID    cefTRIAXone (ROCEPHIN) IV  1 g Intravenous Q24H    sodium chloride flush  10 mL Intravenous 2 times per day    sodium chloride  250 mL Intravenous Once     Continuous Infusions:   sodium chloride 100 mL/hr at 10/23/17 0114    dextrose       CBC:   Recent Labs      10/21/17   1735  10/22/17   0636   WBC  9.6  8.3   HGB  11.8*  10.5*   PLT  281  231     BMP:    Recent Labs      10/21/17   1649  10/21/17   1735  10/22/17   0636   NA   --   132*  135   K   --   5.4*  4.8   CL   --   93*  102   CO2   --   26  24   BUN   --   35*  24*   CREATININE   --   1.63*  1.22*   GLUCOSE  589  569*  311*     Hepatic:   Recent Labs      10/21/17   1735   AST  12   ALT  16   BILITOT  0.51   ALKPHOS  96     Troponin: No results for 8.8% on this admission  6. Elevated lactic acid level secondary to sepsis - trending down. 7.  Mild hyperkalemia due to renal failure - resolved  8. Chronic anemia - on iron supplement, H&H stable  9. Dementia -on Aricept and Namenda  10. Hypothyroidism - on Synthroid, TSH was normal 5/1/17    11. History of fall resulting in left occipital bone fracture    Plan:  1. Add IV Fluconazole secondary to yeast in the urine. 2.  Add Vancomycin with Pharmacy managing awaiting the blood culture. 3.  Increase Lantus to 25 units daily. 4.  Discontinue Lovenox secondary to hematuria. Place SCDs for DVT prophylaxis. 5.  Cardiology consult today as requested by Lima's daughter. She would not be a candidate for anticoagulation at this time with her hematuria and is likely high risk with her history of falls in the past.      Patient continues to require in patient admission secondary to the need for IV antibiotics, IV fluconazole, and close monitoring of condition.       Patient Active Problem List:     COPD (chronic obstructive pulmonary disease) ([address]     Leukocytosis     Closed fracture of occiput, initial encounter ([address]     Elevated myoglobin level     Diabetes type 2, controlled ([address]     Hypothyroidism     Hypertension     Urinary retention     Lower urinary tract infectious disease     Gross hematuria     Right flank pain     Hyperglycemia      Bradley Riggins MD  [facility] Hospitalist

## 2017-10-23 NOTE — PROGRESS NOTES
Bath done. Attends changed of saturated amount of pink colored urine. 2 small blood clots noted with wiping. Up to chair with 2 assist using pivot. Personal safety alarm on and legs elevated. Call light within reach. Denies needs.

## 2017-10-24 LAB
ABSOLUTE EOS #: 0.2 K/UL (ref 0–0.4)
ABSOLUTE IMMATURE GRANULOCYTE: ABNORMAL K/UL (ref 0–0.3)
ABSOLUTE LYMPH #: 1.8 K/UL (ref 1–4.8)
ABSOLUTE MONO #: 0.8 K/UL (ref 0–1)
ANION GAP SERPL CALCULATED.3IONS-SCNC: 10 MMOL/L (ref 9–17)
BASOPHILS # BLD: 0 %
BASOPHILS ABSOLUTE: 0 K/UL (ref 0–0.2)
BUN BLDV-MCNC: 11 MG/DL (ref 8–23)
BUN/CREAT BLD: 14 (ref 9–20)
CALCIUM SERPL-MCNC: 8.5 MG/DL (ref 8.6–10.4)
CHLORIDE BLD-SCNC: 103 MMOL/L (ref 98–107)
CO2: 21 MMOL/L (ref 20–31)
CREAT SERPL-MCNC: 0.79 MG/DL (ref 0.5–0.9)
DIFFERENTIAL TYPE: YES
EOSINOPHILS RELATIVE PERCENT: 3 %
GFR AFRICAN AMERICAN: >60 ML/MIN
GFR NON-AFRICAN AMERICAN: >60 ML/MIN
GFR SERPL CREATININE-BSD FRML MDRD: ABNORMAL ML/MIN/{1.73_M2}
GFR SERPL CREATININE-BSD FRML MDRD: ABNORMAL ML/MIN/{1.73_M2}
GLUCOSE BLD-MCNC: 228 MG/DL (ref 65–99)
GLUCOSE BLD-MCNC: 232 MG/DL (ref 70–99)
GLUCOSE BLD-MCNC: 270 MG/DL (ref 65–99)
GLUCOSE BLD-MCNC: 274 MG/DL (ref 65–99)
HCT VFR BLD CALC: 30.8 % (ref 36–46)
HEMOGLOBIN: 10 G/DL (ref 12–16)
IMMATURE GRANULOCYTES: ABNORMAL %
LYMPHOCYTES # BLD: 24 %
MCH RBC QN AUTO: 30.9 PG (ref 26–34)
MCHC RBC AUTO-ENTMCNC: 32.4 G/DL (ref 31–37)
MCV RBC AUTO: 95.2 FL (ref 80–100)
MONOCYTES # BLD: 10 %
PDW BLD-RTO: 14.7 % (ref 12.1–15.2)
PLATELET # BLD: 223 K/UL (ref 140–450)
PLATELET ESTIMATE: ABNORMAL
PMV BLD AUTO: ABNORMAL FL (ref 6–12)
POTASSIUM SERPL-SCNC: 4.1 MMOL/L (ref 3.7–5.3)
RBC # BLD: 3.23 M/UL (ref 4–5.2)
RBC # BLD: ABNORMAL 10*6/UL
SEG NEUTROPHILS: 63 %
SEGMENTED NEUTROPHILS ABSOLUTE COUNT: 4.6 K/UL (ref 2.5–7)
SODIUM BLD-SCNC: 134 MMOL/L (ref 135–144)
WBC # BLD: 7.4 K/UL (ref 3.5–11)
WBC # BLD: ABNORMAL 10*3/UL

## 2017-10-24 PROCEDURE — 6360000002 HC RX W HCPCS: Performed by: INTERNAL MEDICINE

## 2017-10-24 PROCEDURE — 85025 COMPLETE CBC W/AUTO DIFF WBC: CPT

## 2017-10-24 PROCEDURE — 82947 ASSAY GLUCOSE BLOOD QUANT: CPT

## 2017-10-24 PROCEDURE — 36415 COLL VENOUS BLD VENIPUNCTURE: CPT

## 2017-10-24 PROCEDURE — 93306 TTE W/DOPPLER COMPLETE: CPT

## 2017-10-24 PROCEDURE — 2580000003 HC RX 258: Performed by: INTERNAL MEDICINE

## 2017-10-24 PROCEDURE — 1200000000 HC SEMI PRIVATE

## 2017-10-24 PROCEDURE — 80048 BASIC METABOLIC PNL TOTAL CA: CPT

## 2017-10-24 PROCEDURE — 94761 N-INVAS EAR/PLS OXIMETRY MLT: CPT

## 2017-10-24 PROCEDURE — 6370000000 HC RX 637 (ALT 250 FOR IP): Performed by: INTERNAL MEDICINE

## 2017-10-24 RX ADMIN — INSULIN LISPRO 4 UNITS: 100 INJECTION, SOLUTION INTRAVENOUS; SUBCUTANEOUS at 08:31

## 2017-10-24 RX ADMIN — CEFTRIAXONE 1 G: 1 INJECTION, POWDER, FOR SOLUTION INTRAMUSCULAR; INTRAVENOUS at 21:19

## 2017-10-24 RX ADMIN — BACLOFEN 5 MG: 10 TABLET ORAL at 13:16

## 2017-10-24 RX ADMIN — BACLOFEN 5 MG: 10 TABLET ORAL at 21:21

## 2017-10-24 RX ADMIN — LEVOTHYROXINE SODIUM 88 MCG: 88 TABLET ORAL at 08:29

## 2017-10-24 RX ADMIN — OXYBUTYNIN CHLORIDE 5 MG: 5 TABLET, EXTENDED RELEASE ORAL at 21:27

## 2017-10-24 RX ADMIN — FAMOTIDINE 20 MG: 20 TABLET ORAL at 08:29

## 2017-10-24 RX ADMIN — BACLOFEN 5 MG: 10 TABLET ORAL at 08:28

## 2017-10-24 RX ADMIN — INSULIN LISPRO 2 UNITS: 100 INJECTION, SOLUTION INTRAVENOUS; SUBCUTANEOUS at 21:24

## 2017-10-24 RX ADMIN — INSULIN LISPRO 6 UNITS: 100 INJECTION, SOLUTION INTRAVENOUS; SUBCUTANEOUS at 11:40

## 2017-10-24 RX ADMIN — DOCUSATE SODIUM 100 MG: 100 CAPSULE, LIQUID FILLED ORAL at 21:21

## 2017-10-24 RX ADMIN — INSULIN LISPRO 6 UNITS: 100 INJECTION, SOLUTION INTRAVENOUS; SUBCUTANEOUS at 16:45

## 2017-10-24 RX ADMIN — DONEPEZIL HYDROCHLORIDE 23 MG: 23 TABLET, FILM COATED ORAL at 21:27

## 2017-10-24 RX ADMIN — FERROUS SULFATE TAB 325 MG (65 MG ELEMENTAL FE) 325 MG: 325 (65 FE) TAB at 08:28

## 2017-10-24 RX ADMIN — Medication 10 ML: at 08:28

## 2017-10-24 RX ADMIN — Medication 10 ML: at 21:19

## 2017-10-24 RX ADMIN — ASPIRIN 81 MG 81 MG: 81 TABLET ORAL at 08:29

## 2017-10-24 RX ADMIN — MEMANTINE HYDROCHLORIDE 10 MG: 10 TABLET ORAL at 08:29

## 2017-10-24 RX ADMIN — MEMANTINE HYDROCHLORIDE 10 MG: 10 TABLET ORAL at 21:21

## 2017-10-24 RX ADMIN — VANCOMYCIN HYDROCHLORIDE 1000 MG: 1 INJECTION, POWDER, LYOPHILIZED, FOR SOLUTION INTRAVENOUS at 06:22

## 2017-10-24 RX ADMIN — DOCUSATE SODIUM 100 MG: 100 CAPSULE, LIQUID FILLED ORAL at 08:29

## 2017-10-24 RX ADMIN — FLUCONAZOLE 100 MG: 2 INJECTION INTRAVENOUS at 05:19

## 2017-10-24 ASSESSMENT — PAIN SCALES - GENERAL
PAINLEVEL_OUTOF10: 0

## 2017-10-24 NOTE — CONSULTS
Quite clear to auscultation and percussion. ABDOMEN:  Soft and nontender. Good bowel sounds. Aorta is not  enlarged. No hepatomegaly or splenomegaly. EXTREMITIES:  Good femoral pulse. Good pedal pulse, no pedal edema. Skin warm and dry. No calf tenderness. Nail beds pink. Good  capillary refill. PULSES:  Bilaterally symmetrical radial, brachial, and carotid pulses. No carotid bruit. Good femoral, pedal pulses. NEUROLOGIC EXAM:  Unremarkable except she is confused. PSYCHIATRIC EXAM:  Unremarkable. LABORATORY DATA:  Her sodium 136, potassium 4.5, BUN of 16, creatinine  0.85, GFR greater than 60, magnesium 1.9, calcium 8.5. White count  7.7, hemoglobin 10.4 with a platelet count of 293,897. EKG showed atrial fibrillation with a controlled ventricular response  and nonspecific ST changes, with no specific change since 12/2013,  when she was in atrial fibrillation. IMPRESSION:  1.  UTI with sepsis. 2.  Probable chronic atrial fibrillation, as an EKG in 07/2013 showed  atrial fibrillation and in 12/2013, which also showed atrial  fibrillation, on no anticoagulation. 3.  Insulin-dependent diabetes. 4.  Marked confusion. 5.  History of falling with skull fracture in 2013. 6.  Not an anticoagulation candidate. 7.  No acute coronary syndrome or pathology at this time. PLAN:  1. Continue same medications. 2.  No anticoagulation. DISCUSSION:  The patient is very pleasantly, totally confused. She  has a history of falling. It appears that she has been in probable  chronic atrial fibrillation from before 07/2013. I do not see that it  has ever been treated. Certainly at this age with the history of falling and confusion, I  would not anticoagulate. She could be placed on baby aspirin or even  a whole aspirin after she recovers from her sepsis. An echocardiogram is pending, which I will check, but there is no  congestive heart failure at this time.     Thank you very much for

## 2017-10-24 NOTE — PROGRESS NOTES
Hospitalist Progress Note  10/24/2017 6:02 AM  Subjective:   Admit Date: 10/21/2017  PCP: Pamella Magallanes MD    Interval History: Russell Davis is pleasantly confused with no complaints this am.  Nursing reports that her urine continues to be blood tinged but is improving. Russell Davis denies any pain. She remains afebrile and her WBC is normal (labs reviewed this am). BS has improved to the low 200s with the increase in Lantus. Blood culture with Staph, awaiting sensitivities.       Diet: DIET CARB CONTROL; Carb Control: 4 carbs/meal (approximate 1800 kcals/day)  Dietary Nutrition Supplements: Diabetic Oral Supplement  Medications:   Scheduled Meds:   fluconazole  100 mg Intravenous Q24H    insulin glargine  25 Units Subcutaneous Nightly    vancomycin (VANCOCIN) intermittent dosing (placeholder)   Other RX Placeholder    vancomycin  1,000 mg Intravenous Q24H    aspirin  81 mg Oral Daily    calcium carbonate-vitamin D  1 tablet Oral Daily    Folinic Acid-Vit B6-Vit B12  1 tablet Oral BID    oxybutynin  5 mg Oral Nightly    insulin lispro  0-12 Units Subcutaneous TID WC    insulin lispro  0-6 Units Subcutaneous Nightly    baclofen  5 mg Oral TID    docusate sodium  100 mg Oral BID    Donepezil HCl  23 mg Oral Nightly    famotidine  20 mg Oral Daily    ferrous sulfate  325 mg Oral Daily with breakfast    levothyroxine  88 mcg Oral Daily    memantine  10 mg Oral BID    cefTRIAXone (ROCEPHIN) IV  1 g Intravenous Q24H    sodium chloride flush  10 mL Intravenous 2 times per day    sodium chloride  250 mL Intravenous Once     Continuous Infusions:   sodium chloride 50 mL/hr at 10/23/17 0730    dextrose       CBC:   Recent Labs      10/22/17   0636  10/23/17   0522  10/24/17   0455   WBC  8.3  7.7  7.4   HGB  10.5*  10.4*  10.0*   PLT  231  247  223     BMP:    Recent Labs      10/22/17   0636  10/23/17   0522  10/24/17   0455   NA  135  136  134*   K  4.8  4.5  4.1   CL  102  105  103   CO2  24  20  21   BUN 24*  16  11   CREATININE  1.22*  0.85  0.79   GLUCOSE  311*  214*  232*     Hepatic:   Recent Labs      10/21/17   1735   AST  12   ALT  16   BILITOT  0.51   ALKPHOS  96     Troponin: No results for input(s): TROPONINI in the last 72 hours. BNP: No results for input(s): BNP in the last 72 hours. Lipids: No results for input(s): CHOL, HDL in the last 72 hours. Invalid input(s): LDLCALCU  INR:   Recent Labs      10/21/17   1735   INR  1.0       Objective:   Vitals: BP (!) 131/57   Pulse 93   Temp 98.8 °F (37.1 °C) (Oral)   Resp 18   Ht 5' 4\" (1.626 m)   Wt 161 lb 6 oz (73.2 kg)   SpO2 97%   BMI 27.70 kg/m²   General appearance: alert and cooperative with exam  HEENT: Head: Normocephalic, no lesions, without obvious abnormality. Eye: Normal external eye, conjunctiva, lids cornea, NAOMI. Nose: Normal external nose, mucus membranes and septum. Neck: no adenopathy and supple, symmetrical, trachea midline  Lungs: clear to auscultation bilaterally  Heart: irregularly irregular rhythm, S1, S2 normal and II/VI systolic murmur. Abdomen: soft, non-tender; bowel sounds normal; no masses,  no organomegaly and over wt. Extremities: No calf tenderness. Neurologic: Mental status: Alert, oriented, thought content appropriate    Assessment and Plan:   1. UTI - urine culture grew out yeast, on IV Fluconazole. 2.  Acute renal failure likely secondary to poor oral intake and dehydration - improved with IV hydration. 3. Chronic atrial fibrillation (seen on previous ECGs) - the patient's daughter states that they are not aware of history of A. fib.  Patient's [facility]Gs from [facility] 2013 reviewed and she had Afib.  Pt not on anticoagulation.  Dr. Sammi David evaluated Mona Pedro and feels that she is not a candidate for anticoagulation secondary to age, dementia, and previous fall history (risk greater than the benefit). ECHO results pending. 4. Positive blood culture with gm positive cocci in clusters (Staph).   Awaiting sensitivities. On Vancomycin. 5.  Hyperglycemia secondary to diabetes mellitus type 2 - likely precipitated by infectious process, no signs of DKA, -  continue sliding scale insulin, increase  Lantus dose to 25 units qhs,   Hemoglobin A1c is 8.8% on this admission  6.  Elevated lactic acid level secondary to sepsis - trending down. 7.  Mild hyperkalemia due to renal failure - resolved  8.  Chronic anemia - on iron supplement, H&H stable  9.  Dementia -on Aricept and Namenda  10.  Hypothyroidism - on Synthroid, TSH was normal 5/1/17    11. History of fall resulting in left occipital bone fracture    Plan:  1. Follow up on the final culture and sensitivity from the blood when available. 2.  Continue on Fluconazole for the yeast in the urine. 3.  Can discharge back to ECF once the sensitivities are back on the blood cultures.           Patient Active Problem List:     COPD (chronic obstructive pulmonary disease) ([address]     Leukocytosis     Closed fracture of occiput, initial encounter ([address]     Elevated myoglobin level     Diabetes type 2, controlled ([address]     Hypothyroidism     Hypertension     Urinary retention     Lower urinary tract infectious disease     Gross hematuria     Right flank pain     Hyperglycemia     Moderate malnutrition ([address]      Ryan Riggins MD  [facility] Hospitalist

## 2017-10-24 NOTE — PROGRESS NOTES
Quality flow rounds held on 10/24/17     Shaun Osorio is admitted for  Hyperglycemia. Length of stay 2. Education:    Needed Education: None       Do you have any questions regarding your plan of care while at the hospital? No - pt confused     Planned Disposition:               []  Home when able                [] Swing Bed                [x] ECF/SNF    [facility]                [] Other/TBD    Barriers to Discharge:    Can you afford your medications? Yes   Do you have transportation to follow up appointments? Family    Do you need any new equipment at home? No    Current equipment includes   Walker and wheelchair     Do you or your family have any questions or concerns we haven't already discussed?   No family present

## 2017-10-24 NOTE — PLAN OF CARE
Problem: Falls - Risk of  Goal: Absence of falls  Outcome: Met This Shift      Problem: Risk for Impaired Skin Integrity  Goal: Tissue integrity - skin and mucous membranes  Structural intactness and normal physiological function of skin and  mucous membranes.    Outcome: Met This Shift      Problem: Nutrition  Goal: Optimal nutrition therapy  Outcome: Met This Shift

## 2017-10-25 VITALS
TEMPERATURE: 98.1 F | HEART RATE: 96 BPM | HEIGHT: 64 IN | RESPIRATION RATE: 16 BRPM | BODY MASS INDEX: 27.55 KG/M2 | SYSTOLIC BLOOD PRESSURE: 113 MMHG | WEIGHT: 161.38 LBS | DIASTOLIC BLOOD PRESSURE: 63 MMHG | OXYGEN SATURATION: 98 %

## 2017-10-25 LAB
CULTURE: ABNORMAL
GLUCOSE BLD-MCNC: 107 MG/DL (ref 65–99)
GLUCOSE BLD-MCNC: 262 MG/DL (ref 65–99)
Lab: ABNORMAL
ORGANISM: ABNORMAL
SPECIMEN DESCRIPTION: ABNORMAL
SPECIMEN DESCRIPTION: ABNORMAL
STATUS: ABNORMAL
VANCOMYCIN TROUGH DATE LAST DOSE: ABNORMAL
VANCOMYCIN TROUGH DOSE AMOUNT: ABNORMAL
VANCOMYCIN TROUGH TIME LAST DOSE: ABNORMAL
VANCOMYCIN TROUGH: 7.5 UG/ML (ref 10–20)

## 2017-10-25 PROCEDURE — 80202 ASSAY OF VANCOMYCIN: CPT

## 2017-10-25 PROCEDURE — 36415 COLL VENOUS BLD VENIPUNCTURE: CPT

## 2017-10-25 PROCEDURE — 82947 ASSAY GLUCOSE BLOOD QUANT: CPT

## 2017-10-25 PROCEDURE — 2580000003 HC RX 258: Performed by: INTERNAL MEDICINE

## 2017-10-25 PROCEDURE — 6360000002 HC RX W HCPCS: Performed by: INTERNAL MEDICINE

## 2017-10-25 PROCEDURE — 6370000000 HC RX 637 (ALT 250 FOR IP): Performed by: INTERNAL MEDICINE

## 2017-10-25 RX ORDER — FLUCONAZOLE 100 MG/1
100 TABLET ORAL DAILY
Qty: 7 TABLET | Refills: 0
Start: 2017-10-25 | End: 2017-11-01

## 2017-10-25 RX ADMIN — BACLOFEN 5 MG: 10 TABLET ORAL at 08:44

## 2017-10-25 RX ADMIN — ASPIRIN 81 MG 81 MG: 81 TABLET ORAL at 08:44

## 2017-10-25 RX ADMIN — FLUCONAZOLE 100 MG: 2 INJECTION INTRAVENOUS at 06:17

## 2017-10-25 RX ADMIN — FAMOTIDINE 20 MG: 20 TABLET ORAL at 08:45

## 2017-10-25 RX ADMIN — MEMANTINE HYDROCHLORIDE 10 MG: 10 TABLET ORAL at 08:45

## 2017-10-25 RX ADMIN — INSULIN LISPRO 6 UNITS: 100 INJECTION, SOLUTION INTRAVENOUS; SUBCUTANEOUS at 12:19

## 2017-10-25 RX ADMIN — BACLOFEN 5 MG: 10 TABLET ORAL at 13:25

## 2017-10-25 RX ADMIN — Medication 10 ML: at 08:45

## 2017-10-25 RX ADMIN — LEVOTHYROXINE SODIUM 88 MCG: 88 TABLET ORAL at 08:45

## 2017-10-25 RX ADMIN — DOCUSATE SODIUM 100 MG: 100 CAPSULE, LIQUID FILLED ORAL at 08:45

## 2017-10-25 RX ADMIN — Medication 10 ML: at 06:17

## 2017-10-25 RX ADMIN — FERROUS SULFATE TAB 325 MG (65 MG ELEMENTAL FE) 325 MG: 325 (65 FE) TAB at 08:45

## 2017-10-25 ASSESSMENT — PAIN SCALES - GENERAL
PAINLEVEL_OUTOF10: 0

## 2017-10-25 NOTE — PLAN OF CARE
Problem: Pain:  Goal: Pain level will decrease  Pain level will decrease   Outcome: Met This Shift      Problem: Falls - Risk of  Goal: Absence of falls  Outcome: Met This Shift      Problem: Risk for Impaired Skin Integrity  Goal: Tissue integrity - skin and mucous membranes  Structural intactness and normal physiological function of skin and  mucous membranes.    Outcome: Met This Shift

## 2017-10-25 NOTE — PROGRESS NOTES
Report called to Rayne at [facility]. Pt to be going back to [facility] at 1500. Andre Wallace is checking on transportation back to [facility] per them as Silvano Woods, pts dgtr states she was told by [facility] that they would pick pt up when she was ready to return to [address] [place] [address].

## 2017-10-25 NOTE — DISCHARGE SUMMARY
was controlled on her current medication. He felt with her advanced dementia, age, and fall risk (she had previous falls), that she was at high risk for anticoagulation. ECHO showed normal LV function with an EF of 55%. Her left atrial was severely enlarged and she had moderate Mitral regurgitation. With Lima's improvement it was felt she could be discharged back to [facility] to continue on oral antifungal treatment. Discharge Exam:    Vitals: /72   Pulse 107   Temp 97.9 °F (36.6 °C) (Oral)   Resp 16   Ht 5' 4\" (1.626 m)   Wt 161 lb 6 oz (73.2 kg)   SpO2 97%   BMI 27.70 kg/m²   General appearance: alert and cooperative with exam  HEENT: Head: Normocephalic, no lesions, without obvious abnormality. Eye: Normal external eye, conjunctiva, lids cornea, NAOMI. Nose: Normal external nose, mucus membranes and septum. Neck: no adenopathy and supple, symmetrical, trachea midline  Lungs: clear to auscultation bilaterally  Heart: irregularly irregular rhythm, S1, S2 normal and II/VI systolic murmur. Abdomen: soft, non-tender; bowel sounds normal; no masses,  no organomegaly  Extremities: extremities normal, atraumatic, no cyanosis or edema  Neurologic: Mental status: alert but confused.     Discharge Medications:       Ranjana Mojicahop   Home Medication Instructions LTA:[id number]    Printed on:10/24/17 6297   Medication Information                      acetaminophen (TYLENOL) 500 MG tablet  Take 1,000 mg by mouth every 8 hours as needed for Pain             albuterol (PROVENTIL) (2.5 MG/3ML) 0.083% nebulizer solution  Take 2.5 mg by nebulization every 6 hours as needed for Wheezing             amLODIPine (NORVASC) 5 MG tablet  Take 5 mg by mouth daily             aspirin 81 MG tablet  Take 81 mg by mouth daily             baclofen (LIORESAL) 10 MG tablet  Take 5 mg by mouth 3 times daily             bisacodyl (DULCOLAX) 10 MG suppository  Place 10 mg rectally daily as needed for Constipation Calcium Carb-Cholecalciferol (CALCIUM-VITAMIN D) 600-400 MG-UNIT TABS  Take 1 tablet by mouth daily             Dextrose, Diabetic Use, (GLUTOSE 15 PO)  Take 1 Tube by mouth as needed             docusate sodium (COLACE) 100 MG capsule  Take 100 mg by mouth 2 times daily              Donepezil HCl (ARICEPT) 23 MG TABS tablet  Take 23 mg by mouth nightly             famotidine (PEPCID) 20 MG tablet  Take 20 mg by mouth daily              ferrous sulfate 325 (65 FE) MG tablet  Take 325 mg by mouth daily (with breakfast)             Folinic Acid-Vit B6-Vit B12 (FOLINIC-PLUS PO)  Take 1 tablet by mouth 2 times daily             furosemide (LASIX) 20 MG tablet  Take 20 mg by mouth 2 times daily              glucagon, rDNA, 1 MG injection  Inject 1 mg into the muscle as needed for Low blood sugar             guaiFENesin (ROBITUSSIN) 100 MG/5ML SOLN oral solution  Take 10 mLs by mouth every 4 hours as needed for Cough             hydrOXYzine (ATARAX) 25 MG tablet  Take 25 mg by mouth every 6 hours as needed for Itching             insulin aspart (NOVOLOG) 100 UNIT/ML injection vial  Inject 5 Units into the skin 3 times daily (before meals)              insulin glargine (LANTUS) 100 UNIT/ML injection  Inject 15 Units into the skin nightly              levothyroxine (SYNTHROID) 100 MCG tablet  Take 88 mcg by mouth daily              lisinopril (PRINIVIL;ZESTRIL) 10 MG tablet  Take 10 mg by mouth daily             magnesium hydroxide (MILK OF MAGNESIA) 400 MG/5ML suspension  Take 30 mLs by mouth daily as needed for Constipation             memantine (NAMENDA) 10 MG tablet  Take 10 mg by mouth 2 times daily             oxybutynin (DITROPAN-XL) 5 MG extended release tablet  Take 5 mg by mouth nightly             potassium chloride (MICRO-K) 10 MEQ CR capsule    Take 20 mEq by mouth 2 times daily              simethicone (MYLICON) 80 MG chewable tablet  Take 80 mg by mouth 2 times daily as needed for Flatulence

## 2017-10-25 NOTE — PROGRESS NOTES
Hospitalist Progress Note  10/25/2017 7:19 AM  Subjective:   Admit Date: 10/21/2017  PCP: Juju Sharpe MD    Interval History: Marleny Mendoza continues to do well. When asked if she has pain she points to her stomach but I asked again a few minutes later and she denied pain. Blood culture grew out a coag negative Staph. She has been afebrile. Her BS continues to run in the 200s. Diet: DIET CARB CONTROL; Carb Control: 4 carbs/meal (approximate 1800 kcals/day)  Dietary Nutrition Supplements: Diabetic Oral Supplement  Medications:   Scheduled Meds:   fluconazole  100 mg Intravenous Q24H    insulin glargine  25 Units Subcutaneous Nightly    vancomycin (VANCOCIN) intermittent dosing (placeholder)   Other RX Placeholder    vancomycin  1,000 mg Intravenous Q24H    aspirin  81 mg Oral Daily    calcium carbonate-vitamin D  1 tablet Oral Daily    Folinic Acid-Vit B6-Vit B12  1 tablet Oral BID    oxybutynin  5 mg Oral Nightly    insulin lispro  0-12 Units Subcutaneous TID WC    insulin lispro  0-6 Units Subcutaneous Nightly    baclofen  5 mg Oral TID    docusate sodium  100 mg Oral BID    Donepezil HCl  23 mg Oral Nightly    famotidine  20 mg Oral Daily    ferrous sulfate  325 mg Oral Daily with breakfast    levothyroxine  88 mcg Oral Daily    memantine  10 mg Oral BID    cefTRIAXone (ROCEPHIN) IV  1 g Intravenous Q24H    sodium chloride flush  10 mL Intravenous 2 times per day    sodium chloride  250 mL Intravenous Once     Continuous Infusions:   dextrose       CBC:   Recent Labs      10/23/17   0522  10/24/17   0455   WBC  7.7  7.4   HGB  10.4*  10.0*   PLT  247  223     BMP:    Recent Labs      10/23/17   0522  10/24/17   0455   NA  136  134*   K  4.5  4.1   CL  105  103   CO2  20  21   BUN  16  11   CREATININE  0.85  0.79   GLUCOSE  214*  232*     Hepatic: No results for input(s): AST, ALT, ALB, BILITOT, ALKPHOS in the last 72 hours.   Troponin: No results for input(s): TROPONINI in the last 72

## 2017-10-26 LAB — GLUCOSE BLD-MCNC: 240 MG/DL (ref 65–99)

## 2017-11-11 ENCOUNTER — HOSPITAL ENCOUNTER (INPATIENT)
Age: 82
LOS: 3 days | Discharge: NURSING, REHAB OR LONG-TERM CARE | DRG: 683 | End: 2017-11-14
Attending: EMERGENCY MEDICINE | Admitting: INTERNAL MEDICINE
Payer: MEDICARE

## 2017-11-11 DIAGNOSIS — R31.0 GROSS HEMATURIA: Primary | ICD-10-CM

## 2017-11-11 LAB
-: ABNORMAL
ABSOLUTE EOS #: 0.3 K/UL (ref 0–0.4)
ABSOLUTE IMMATURE GRANULOCYTE: ABNORMAL K/UL (ref 0–0.3)
ABSOLUTE LYMPH #: 2.7 K/UL (ref 1–4.8)
ABSOLUTE MONO #: 0.7 K/UL (ref 0–1)
AMORPHOUS: ABNORMAL
ANION GAP SERPL CALCULATED.3IONS-SCNC: 14 MMOL/L (ref 9–17)
BACTERIA: ABNORMAL
BASOPHILS # BLD: 0 %
BASOPHILS ABSOLUTE: 0 K/UL (ref 0–0.2)
BILIRUBIN URINE: NEGATIVE
BUN BLDV-MCNC: 28 MG/DL (ref 8–23)
BUN/CREAT BLD: 22 (ref 9–20)
CALCIUM SERPL-MCNC: 9.6 MG/DL (ref 8.6–10.4)
CASTS UA: ABNORMAL /LPF
CHLORIDE BLD-SCNC: 97 MMOL/L (ref 98–107)
CO2: 22 MMOL/L (ref 20–31)
COLOR: ABNORMAL
COMMENT UA: ABNORMAL
CREAT SERPL-MCNC: 1.26 MG/DL (ref 0.5–0.9)
CRYSTALS, UA: ABNORMAL /HPF
DIFFERENTIAL TYPE: YES
EOSINOPHILS RELATIVE PERCENT: 3 %
EPITHELIAL CELLS UA: ABNORMAL /HPF
GFR AFRICAN AMERICAN: 48 ML/MIN
GFR NON-AFRICAN AMERICAN: 40 ML/MIN
GFR SERPL CREATININE-BSD FRML MDRD: ABNORMAL ML/MIN/{1.73_M2}
GFR SERPL CREATININE-BSD FRML MDRD: ABNORMAL ML/MIN/{1.73_M2}
GLUCOSE BLD-MCNC: 292 MG/DL (ref 65–99)
GLUCOSE BLD-MCNC: 99 MG/DL (ref 70–99)
GLUCOSE URINE: NEGATIVE
HCT VFR BLD CALC: 33.4 % (ref 36–46)
HCT VFR BLD CALC: 34.8 % (ref 36–46)
HEMOGLOBIN: 10.9 G/DL (ref 12–16)
HEMOGLOBIN: 11.5 G/DL (ref 12–16)
IMMATURE GRANULOCYTES: ABNORMAL %
KETONES, URINE: NEGATIVE
LEUKOCYTE ESTERASE, URINE: ABNORMAL
LYMPHOCYTES # BLD: 28 %
MCH RBC QN AUTO: 30.6 PG (ref 26–34)
MCHC RBC AUTO-ENTMCNC: 32.7 G/DL (ref 31–37)
MCV RBC AUTO: 93.7 FL (ref 80–100)
MONOCYTES # BLD: 8 %
MUCUS: ABNORMAL
NITRITE, URINE: NEGATIVE
OTHER OBSERVATIONS UA: ABNORMAL
PDW BLD-RTO: 14.9 % (ref 12.1–15.2)
PH UA: 7 (ref 5–8)
PLATELET # BLD: 358 K/UL (ref 140–450)
PLATELET ESTIMATE: ABNORMAL
PMV BLD AUTO: ABNORMAL FL (ref 6–12)
POTASSIUM SERPL-SCNC: 5 MMOL/L (ref 3.7–5.3)
PROTEIN UA: ABNORMAL
RBC # BLD: 3.57 M/UL (ref 4–5.2)
RBC # BLD: ABNORMAL 10*6/UL
RBC UA: ABNORMAL /HPF (ref 0–2)
RENAL EPITHELIAL, UA: ABNORMAL /HPF
SEG NEUTROPHILS: 61 %
SEGMENTED NEUTROPHILS ABSOLUTE COUNT: 6.1 K/UL (ref 2.5–7)
SODIUM BLD-SCNC: 133 MMOL/L (ref 135–144)
SPECIFIC GRAVITY UA: 1.01 (ref 1–1.03)
TRICHOMONAS: ABNORMAL
TURBIDITY: ABNORMAL
URINE HGB: ABNORMAL
UROBILINOGEN, URINE: NORMAL
WBC # BLD: 9.8 K/UL (ref 3.5–11)
WBC # BLD: ABNORMAL 10*3/UL
WBC UA: ABNORMAL /HPF
YEAST: ABNORMAL

## 2017-11-11 PROCEDURE — 36415 COLL VENOUS BLD VENIPUNCTURE: CPT

## 2017-11-11 PROCEDURE — 1200000000 HC SEMI PRIVATE

## 2017-11-11 PROCEDURE — 82947 ASSAY GLUCOSE BLOOD QUANT: CPT

## 2017-11-11 PROCEDURE — 85018 HEMOGLOBIN: CPT

## 2017-11-11 PROCEDURE — 6360000002 HC RX W HCPCS: Performed by: INTERNAL MEDICINE

## 2017-11-11 PROCEDURE — 85014 HEMATOCRIT: CPT

## 2017-11-11 PROCEDURE — 83036 HEMOGLOBIN GLYCOSYLATED A1C: CPT

## 2017-11-11 PROCEDURE — 87086 URINE CULTURE/COLONY COUNT: CPT

## 2017-11-11 PROCEDURE — 85025 COMPLETE CBC W/AUTO DIFF WBC: CPT

## 2017-11-11 PROCEDURE — 99285 EMERGENCY DEPT VISIT HI MDM: CPT

## 2017-11-11 PROCEDURE — 6370000000 HC RX 637 (ALT 250 FOR IP): Performed by: INTERNAL MEDICINE

## 2017-11-11 PROCEDURE — 81001 URINALYSIS AUTO W/SCOPE: CPT

## 2017-11-11 PROCEDURE — 80048 BASIC METABOLIC PNL TOTAL CA: CPT

## 2017-11-11 PROCEDURE — 2580000003 HC RX 258: Performed by: INTERNAL MEDICINE

## 2017-11-11 RX ORDER — INSULIN GLARGINE 100 [IU]/ML
10 INJECTION, SOLUTION SUBCUTANEOUS EVERY MORNING
COMMUNITY

## 2017-11-11 RX ORDER — OYSTER SHELL CALCIUM WITH VITAMIN D 500; 200 MG/1; [IU]/1
TABLET, FILM COATED ORAL DAILY
Status: DISCONTINUED | OUTPATIENT
Start: 2017-11-11 | End: 2017-11-14 | Stop reason: HOSPADM

## 2017-11-11 RX ORDER — SODIUM CHLORIDE 0.9 % (FLUSH) 0.9 %
10 SYRINGE (ML) INJECTION PRN
Status: DISCONTINUED | OUTPATIENT
Start: 2017-11-11 | End: 2017-11-14 | Stop reason: HOSPADM

## 2017-11-11 RX ORDER — DONEPEZIL HYDROCHLORIDE 23 MG/1
23 TABLET, FILM COATED ORAL NIGHTLY
Status: DISCONTINUED | OUTPATIENT
Start: 2017-11-11 | End: 2017-11-14 | Stop reason: HOSPADM

## 2017-11-11 RX ORDER — OXYBUTYNIN CHLORIDE 5 MG/1
5 TABLET, EXTENDED RELEASE ORAL NIGHTLY
Status: DISCONTINUED | OUTPATIENT
Start: 2017-11-11 | End: 2017-11-14 | Stop reason: HOSPADM

## 2017-11-11 RX ORDER — DEXTROSE MONOHYDRATE 25 G/50ML
12.5 INJECTION, SOLUTION INTRAVENOUS PRN
Status: DISCONTINUED | OUTPATIENT
Start: 2017-11-11 | End: 2017-11-11 | Stop reason: SDUPTHER

## 2017-11-11 RX ORDER — AMLODIPINE BESYLATE 5 MG/1
5 TABLET ORAL DAILY
Status: DISCONTINUED | OUTPATIENT
Start: 2017-11-11 | End: 2017-11-12

## 2017-11-11 RX ORDER — DEXTROSE MONOHYDRATE 50 MG/ML
100 INJECTION, SOLUTION INTRAVENOUS PRN
Status: DISCONTINUED | OUTPATIENT
Start: 2017-11-11 | End: 2017-11-11 | Stop reason: SDUPTHER

## 2017-11-11 RX ORDER — SIMETHICONE 80 MG
80 TABLET,CHEWABLE ORAL 2 TIMES DAILY PRN
COMMUNITY

## 2017-11-11 RX ORDER — GLUCOSAMINE HCL 500 MG
1 TABLET ORAL DAILY
Status: DISCONTINUED | OUTPATIENT
Start: 2017-11-11 | End: 2017-11-14 | Stop reason: HOSPADM

## 2017-11-11 RX ORDER — ALBUTEROL SULFATE 2.5 MG/3ML
2.5 SOLUTION RESPIRATORY (INHALATION) EVERY 6 HOURS PRN
Status: DISCONTINUED | OUTPATIENT
Start: 2017-11-11 | End: 2017-11-14 | Stop reason: HOSPADM

## 2017-11-11 RX ORDER — NICOTINE POLACRILEX 4 MG
15 LOZENGE BUCCAL PRN
Status: DISCONTINUED | OUTPATIENT
Start: 2017-11-11 | End: 2017-11-14 | Stop reason: HOSPADM

## 2017-11-11 RX ORDER — DEXTROSE MONOHYDRATE 25 G/50ML
12.5 INJECTION, SOLUTION INTRAVENOUS PRN
Status: DISCONTINUED | OUTPATIENT
Start: 2017-11-11 | End: 2017-11-14 | Stop reason: HOSPADM

## 2017-11-11 RX ORDER — FAMOTIDINE 20 MG/1
20 TABLET, FILM COATED ORAL DAILY
Status: DISCONTINUED | OUTPATIENT
Start: 2017-11-11 | End: 2017-11-14 | Stop reason: HOSPADM

## 2017-11-11 RX ORDER — LEVOTHYROXINE SODIUM 88 UG/1
88 TABLET ORAL DAILY
Status: DISCONTINUED | OUTPATIENT
Start: 2017-11-12 | End: 2017-11-14 | Stop reason: HOSPADM

## 2017-11-11 RX ORDER — NICOTINE POLACRILEX 4 MG
15 LOZENGE BUCCAL PRN
Status: DISCONTINUED | OUTPATIENT
Start: 2017-11-11 | End: 2017-11-11 | Stop reason: SDUPTHER

## 2017-11-11 RX ORDER — FERROUS SULFATE 325(65) MG
325 TABLET ORAL
Status: DISCONTINUED | OUTPATIENT
Start: 2017-11-12 | End: 2017-11-14 | Stop reason: HOSPADM

## 2017-11-11 RX ORDER — INSULIN GLARGINE 100 [IU]/ML
INJECTION, SOLUTION SUBCUTANEOUS SEE ADMIN INSTRUCTIONS
Status: ON HOLD | COMMUNITY
End: 2017-11-11

## 2017-11-11 RX ORDER — SODIUM CHLORIDE 9 MG/ML
INJECTION, SOLUTION INTRAVENOUS CONTINUOUS
Status: DISCONTINUED | OUTPATIENT
Start: 2017-11-11 | End: 2017-11-14 | Stop reason: HOSPADM

## 2017-11-11 RX ORDER — ONDANSETRON 2 MG/ML
4 INJECTION INTRAMUSCULAR; INTRAVENOUS EVERY 6 HOURS PRN
Status: DISCONTINUED | OUTPATIENT
Start: 2017-11-11 | End: 2017-11-14 | Stop reason: HOSPADM

## 2017-11-11 RX ORDER — BISACODYL 10 MG
10 SUPPOSITORY, RECTAL RECTAL DAILY PRN
COMMUNITY

## 2017-11-11 RX ORDER — ACETAMINOPHEN 500 MG
1000 TABLET ORAL EVERY 8 HOURS PRN
Status: DISCONTINUED | OUTPATIENT
Start: 2017-11-11 | End: 2017-11-14 | Stop reason: HOSPADM

## 2017-11-11 RX ORDER — DEXTROSE MONOHYDRATE 50 MG/ML
100 INJECTION, SOLUTION INTRAVENOUS PRN
Status: DISCONTINUED | OUTPATIENT
Start: 2017-11-11 | End: 2017-11-14 | Stop reason: HOSPADM

## 2017-11-11 RX ORDER — DOCUSATE SODIUM 100 MG/1
100 CAPSULE, LIQUID FILLED ORAL 2 TIMES DAILY
Status: DISCONTINUED | OUTPATIENT
Start: 2017-11-11 | End: 2017-11-14 | Stop reason: HOSPADM

## 2017-11-11 RX ORDER — BACLOFEN 10 MG/1
5 TABLET ORAL 3 TIMES DAILY
Status: DISCONTINUED | OUTPATIENT
Start: 2017-11-11 | End: 2017-11-14 | Stop reason: HOSPADM

## 2017-11-11 RX ORDER — ACETAMINOPHEN 325 MG/1
650 TABLET ORAL EVERY 4 HOURS PRN
Status: DISCONTINUED | OUTPATIENT
Start: 2017-11-11 | End: 2017-11-11 | Stop reason: SDUPTHER

## 2017-11-11 RX ORDER — SODIUM CHLORIDE 0.9 % (FLUSH) 0.9 %
10 SYRINGE (ML) INJECTION EVERY 12 HOURS SCHEDULED
Status: DISCONTINUED | OUTPATIENT
Start: 2017-11-11 | End: 2017-11-14 | Stop reason: HOSPADM

## 2017-11-11 RX ORDER — BISACODYL 10 MG
10 SUPPOSITORY, RECTAL RECTAL DAILY PRN
Status: DISCONTINUED | OUTPATIENT
Start: 2017-11-11 | End: 2017-11-14 | Stop reason: HOSPADM

## 2017-11-11 RX ORDER — CEPHALEXIN 500 MG/1
500 CAPSULE ORAL DAILY
COMMUNITY

## 2017-11-11 RX ORDER — INSULIN GLARGINE 100 [IU]/ML
20 INJECTION, SOLUTION SUBCUTANEOUS NIGHTLY
COMMUNITY

## 2017-11-11 RX ADMIN — OXYBUTYNIN CHLORIDE 5 MG: 5 TABLET, EXTENDED RELEASE ORAL at 21:22

## 2017-11-11 RX ADMIN — CEFTRIAXONE 1 G: 1 INJECTION, POWDER, FOR SOLUTION INTRAMUSCULAR; INTRAVENOUS at 18:37

## 2017-11-11 RX ADMIN — INSULIN LISPRO 1 UNITS: 100 INJECTION, SOLUTION INTRAVENOUS; SUBCUTANEOUS at 23:40

## 2017-11-11 RX ADMIN — INSULIN GLARGINE 20 UNITS: 100 INJECTION, SOLUTION SUBCUTANEOUS at 21:23

## 2017-11-11 RX ADMIN — SODIUM CHLORIDE: 9 INJECTION, SOLUTION INTRAVENOUS at 18:30

## 2017-11-11 RX ADMIN — DOCUSATE SODIUM 100 MG: 100 CAPSULE, LIQUID FILLED ORAL at 21:22

## 2017-11-11 RX ADMIN — DONEPEZIL HYDROCHLORIDE 23 MG: 23 TABLET, FILM COATED ORAL at 21:22

## 2017-11-11 RX ADMIN — BACLOFEN 5 MG: 10 TABLET ORAL at 21:22

## 2017-11-11 ASSESSMENT — PAIN SCALES - GENERAL: PAINLEVEL_OUTOF10: 0

## 2017-11-11 NOTE — ED NOTES
Pt reevaluated by Dr Yue Keith, at this time bladder feels distended. 15 fr red rubber straight cath performed with gross hematuria with clots. Bladder irrigation performed through red rubber catheter with less return due to clots. Red rubber removed followed by large clot and retrained irrigation.       Ramona Engel RN  11/11/17 5730

## 2017-11-11 NOTE — ED PROVIDER NOTES
REVIEW OF SYSTEMS    Constitutional:  Denies fever, chills, weight loss or weakness   Eyes:  Denies photophobia or discharge   HENT:  Denies sore throat or ear pain   Respiratory:  Denies cough or shortness of breath   Cardiovascular:  Denies chest pain, palpitations or swelling   GI:  Vaginal bleeding. Musculoskeletal:  Denies back pain   Skin:  Denies rash   Neurologic:  Denies headache, focal weakness or sensory changes   Endocrine:  Denies polyuria or polydypsia   Lymphatic:  Denies swollen glands   Psychiatric:  Denies depression, suicidal ideation or homicidal ideation   All systems negative except as marked. PHYSICAL EXAM    VITAL SIGNS: BP (!) 106/52   Pulse 78   Temp 98.1 °F (36.7 °C) (Oral)   Resp 18   Ht 5' 4\" (1.626 m)   Wt 156 lb 7 oz (71 kg)   SpO2 95%   BMI 26.85 kg/m²    Constitutional:  Well developed, Well nourished, No acute distress, Non-toxic appearance. HENT:  Normocephalic, Atraumatic, Bilateral external ears normal, Oropharynx moist, No oral exudates, Nose normal. Neck- Normal range of motion, No tenderness, Supple, No stridor. Eyes:  PERRL, EOMI, Conjunctiva normal, No discharge. Respiratory:  Normal breath sounds, No respiratory distress, No wheezing, No chest tenderness. Cardiovascular:  Normal heart rate, Normal rhythm, No murmurs, No rubs, No gallops. GI:  Bowel sounds normal, Soft, No tenderness, No masses, No pulsatile masses. : Patient has minimal bleeding at the vaginal introitus no clots. Musculoskeletal:  Intact distal pulses, No edema, No tenderness, No cyanosis, No clubbing. Good range of motion in all major joints. No tenderness to palpation or major deformities noted. Back- No tenderness. Integument:  Warm, Dry, No erythema, No rash. Lymphatic:  No lymphadenopathy noted. Neurologic:  Alert & oriented x 3, Normal motor function, Normal sensory function, No focal deficits noted.    Psychiatric:  Affect normal, Judgment normal, Mood normal. (10 mLs Intravenous Not Given 11/12/17 0817)   sodium chloride flush 0.9 % injection 10 mL (not administered)   magnesium hydroxide (MILK OF MAGNESIA) 400 MG/5ML suspension 30 mL (not administered)   ondansetron (ZOFRAN) injection 4 mg (not administered)   0.9 % sodium chloride infusion ( Intravenous New Bag 11/11/17 1830)   cefTRIAXone (ROCEPHIN) 1 g in sterile water 10 mL IV syringe (1 g Intravenous New Bag 11/11/17 1837)   insulin glargine (LANTUS) injection pen 10 Units (not administered)   glucose (GLUTOSE) 40 % oral gel 15 g (not administered)   dextrose 50 % solution 12.5 g (not administered)   glucagon (rDNA) injection 1 mg (not administered)   dextrose 5 % solution (not administered)   insulin lispro (HUMALOG) injection pen 0-12 Units (0 Units Subcutaneous Not Given 11/12/17 0817)   insulin lispro (HUMALOG) injection pen 0-6 Units (1 Units Subcutaneous Given 11/11/17 2340)       New Prescriptions from this visit:    Current Discharge Medication List      START taking these medications    Details   conjugated estrogens (PREMARIN) 0.625 MG/GM vaginal cream Place vaginally daily. Qty: 1 Tube, Refills: 3             Follow-up:  Pamella Magallanes MD  [address] [place] [address]    Schedule an appointment as soon as possible for a visit           Final Impression:   1.  Gross hematuria               (Please note that portions of this note were completed with a voice recognition program.  Efforts were made to edit the dictations but occasionally words are mis-transcribed.)        Arvil Hammans, MD  11/11/17 Gino Greenfield MD  11/12/17 9406       Arvil Hammans, MD  11/14/17 4819

## 2017-11-12 LAB
ABO/RH: NORMAL
ANION GAP SERPL CALCULATED.3IONS-SCNC: 14 MMOL/L (ref 9–17)
ANTIBODY SCREEN: NEGATIVE
ARM BAND NUMBER: NORMAL
BUN BLDV-MCNC: 21 MG/DL (ref 8–23)
BUN/CREAT BLD: 22 (ref 9–20)
CALCIUM SERPL-MCNC: 9.3 MG/DL (ref 8.6–10.4)
CHLORIDE BLD-SCNC: 103 MMOL/L (ref 98–107)
CO2: 21 MMOL/L (ref 20–31)
CREAT SERPL-MCNC: 0.96 MG/DL (ref 0.5–0.9)
CULTURE: NO GROWTH
CULTURE: NORMAL
ESTIMATED AVERAGE GLUCOSE: 186 MG/DL
EXPIRATION DATE: NORMAL
GFR AFRICAN AMERICAN: >60 ML/MIN
GFR NON-AFRICAN AMERICAN: 54 ML/MIN
GFR SERPL CREATININE-BSD FRML MDRD: ABNORMAL ML/MIN/{1.73_M2}
GFR SERPL CREATININE-BSD FRML MDRD: ABNORMAL ML/MIN/{1.73_M2}
GLUCOSE BLD-MCNC: 106 MG/DL (ref 70–99)
GLUCOSE BLD-MCNC: 122 MG/DL (ref 65–99)
GLUCOSE BLD-MCNC: 155 MG/DL (ref 65–99)
GLUCOSE BLD-MCNC: 179 MG/DL (ref 65–99)
GLUCOSE BLD-MCNC: 89 MG/DL (ref 65–99)
GLUCOSE BLD-MCNC: 95 MG/DL (ref 65–99)
HBA1C MFR BLD: 8.1 % (ref 4.8–5.9)
HCT VFR BLD CALC: 30.8 % (ref 36–46)
HCT VFR BLD CALC: 31.5 % (ref 36–46)
HCT VFR BLD CALC: 33.8 % (ref 36–46)
HEMOGLOBIN: 10.3 G/DL (ref 12–16)
HEMOGLOBIN: 11.1 G/DL (ref 12–16)
HEMOGLOBIN: 9.9 G/DL (ref 12–16)
Lab: NORMAL
MAGNESIUM: 1.9 MG/DL (ref 1.6–2.6)
POTASSIUM SERPL-SCNC: 4.3 MMOL/L (ref 3.7–5.3)
SODIUM BLD-SCNC: 138 MMOL/L (ref 135–144)
SPECIMEN DESCRIPTION: NORMAL
SPECIMEN DESCRIPTION: NORMAL
STATUS: NORMAL

## 2017-11-12 PROCEDURE — 1200000000 HC SEMI PRIVATE

## 2017-11-12 PROCEDURE — 86900 BLOOD TYPING SEROLOGIC ABO: CPT

## 2017-11-12 PROCEDURE — 86850 RBC ANTIBODY SCREEN: CPT

## 2017-11-12 PROCEDURE — 80048 BASIC METABOLIC PNL TOTAL CA: CPT

## 2017-11-12 PROCEDURE — 85018 HEMOGLOBIN: CPT

## 2017-11-12 PROCEDURE — 6370000000 HC RX 637 (ALT 250 FOR IP): Performed by: INTERNAL MEDICINE

## 2017-11-12 PROCEDURE — 82947 ASSAY GLUCOSE BLOOD QUANT: CPT

## 2017-11-12 PROCEDURE — 36415 COLL VENOUS BLD VENIPUNCTURE: CPT

## 2017-11-12 PROCEDURE — 6360000002 HC RX W HCPCS: Performed by: INTERNAL MEDICINE

## 2017-11-12 PROCEDURE — 86901 BLOOD TYPING SEROLOGIC RH(D): CPT

## 2017-11-12 PROCEDURE — 83735 ASSAY OF MAGNESIUM: CPT

## 2017-11-12 PROCEDURE — 2580000003 HC RX 258: Performed by: INTERNAL MEDICINE

## 2017-11-12 PROCEDURE — 85014 HEMATOCRIT: CPT

## 2017-11-12 PROCEDURE — 94761 N-INVAS EAR/PLS OXIMETRY MLT: CPT

## 2017-11-12 RX ADMIN — CEFTRIAXONE 1 G: 1 INJECTION, POWDER, FOR SOLUTION INTRAMUSCULAR; INTRAVENOUS at 17:27

## 2017-11-12 RX ADMIN — FERROUS SULFATE TAB 325 MG (65 MG ELEMENTAL FE) 325 MG: 325 (65 FE) TAB at 08:11

## 2017-11-12 RX ADMIN — SODIUM CHLORIDE: 9 INJECTION, SOLUTION INTRAVENOUS at 09:34

## 2017-11-12 RX ADMIN — INSULIN GLARGINE 20 UNITS: 100 INJECTION, SOLUTION SUBCUTANEOUS at 21:12

## 2017-11-12 RX ADMIN — AMLODIPINE BESYLATE 5 MG: 5 TABLET ORAL at 08:16

## 2017-11-12 RX ADMIN — INSULIN GLARGINE 10 UNITS: 100 INJECTION, SOLUTION SUBCUTANEOUS at 10:18

## 2017-11-12 RX ADMIN — Medication 1 TABLET: at 10:21

## 2017-11-12 RX ADMIN — BACLOFEN 5 MG: 10 TABLET ORAL at 17:27

## 2017-11-12 RX ADMIN — BACLOFEN 5 MG: 10 TABLET ORAL at 21:10

## 2017-11-12 RX ADMIN — FAMOTIDINE 20 MG: 20 TABLET ORAL at 08:11

## 2017-11-12 RX ADMIN — DOCUSATE SODIUM 100 MG: 100 CAPSULE, LIQUID FILLED ORAL at 21:10

## 2017-11-12 RX ADMIN — LEVOTHYROXINE SODIUM 88 MCG: 88 TABLET ORAL at 08:11

## 2017-11-12 RX ADMIN — CALCIUM CARBONATE-VITAMIN D TAB 500 MG-200 UNIT 1 TABLET: 500-200 TAB at 08:11

## 2017-11-12 RX ADMIN — DONEPEZIL HYDROCHLORIDE 23 MG: 23 TABLET, FILM COATED ORAL at 21:11

## 2017-11-12 RX ADMIN — BACLOFEN 5 MG: 10 TABLET ORAL at 08:11

## 2017-11-12 RX ADMIN — INSULIN LISPRO 2 UNITS: 100 INJECTION, SOLUTION INTRAVENOUS; SUBCUTANEOUS at 12:15

## 2017-11-12 RX ADMIN — DOCUSATE SODIUM 100 MG: 100 CAPSULE, LIQUID FILLED ORAL at 08:11

## 2017-11-12 RX ADMIN — OXYBUTYNIN CHLORIDE 5 MG: 5 TABLET, EXTENDED RELEASE ORAL at 21:10

## 2017-11-12 ASSESSMENT — PAIN DESCRIPTION - DESCRIPTORS: DESCRIPTORS: ACHING

## 2017-11-12 ASSESSMENT — PAIN SCALES - GENERAL
PAINLEVEL_OUTOF10: 3
PAINLEVEL_OUTOF10: 0

## 2017-11-12 ASSESSMENT — PAIN DESCRIPTION - ORIENTATION: ORIENTATION: RIGHT;LEFT

## 2017-11-12 ASSESSMENT — PAIN DESCRIPTION - LOCATION: LOCATION: SHOULDER

## 2017-11-12 ASSESSMENT — PAIN DESCRIPTION - PAIN TYPE: TYPE: CHRONIC PAIN

## 2017-11-12 NOTE — PROGRESS NOTES
Urology notified of consult. Advised that NP will be in his office on Monday. Will need to call on Monday to inform NP of consult. No new orders received.

## 2017-11-12 NOTE — H&P
11.0 k/uL    RBC 3.57 (L) 4.0 - 5.2 m/uL    Hemoglobin 10.9 (L) 12.0 - 16.0 g/dL    Hematocrit 33.4 (L) 36 - 46 %    MCV 93.7 80 - 100 fL    MCH 30.6 26 - 34 pg    MCHC 32.7 31 - 37 g/dL    RDW 14.9 12.1 - 15.2 %    Platelets 389 342 - 861 k/uL    MPV NOT REPORTED 6.0 - 12.0 fL    Differential Type YES     Seg Neutrophils 61 %    Lymphocytes 28 %    Monocytes 8 %    Eosinophils % 3 %    Basophils 0 %    Immature Granulocytes NOT REPORTED 0 %    Segs Absolute 6.10 2.5 - 7.0 k/uL    Absolute Lymph # 2.70 1.0 - 4.8 k/uL    Absolute Mono # 0.70 0.0 - 1.0 k/uL    Absolute Eos # 0.30 0.0 - 0.4 k/uL    Basophils # 0.00 0.0 - 0.2 k/uL    Absolute Immature Granulocyte NOT REPORTED 0.00 - 0.30 k/uL    WBC Morphology NOT REPORTED     RBC Morphology NOT REPORTED     Platelet Estimate NOT REPORTED    Basic Metabolic Panel    Collection Time: 11/11/17  2:58 PM   Result Value Ref Range    Glucose 99 70 - 99 mg/dL    BUN 28 (H) 8 - 23 mg/dL    CREATININE 1.26 (H) 0.50 - 0.90 mg/dL    Bun/Cre Ratio 22 (H) 9 - 20    Calcium 9.6 8.6 - 10.4 mg/dL    Sodium 133 (L) 135 - 144 mmol/L    Potassium 5.0 3.7 - 5.3 mmol/L    Chloride 97 (L) 98 - 107 mmol/L    CO2 22 20 - 31 mmol/L    Anion Gap 14 9 - 17 mmol/L    GFR Non-African American 40 (L) >60 mL/min    GFR  48 (L) >60 mL/min    GFR Comment          GFR Staging NOT REPORTED    Urinalysis    Collection Time: 11/11/17  4:17 PM   Result Value Ref Range    Color, UA RED (A) YEL    Turbidity UA CLOUDY (A) CLEAR    Glucose, Ur NEGATIVE NEG    Bilirubin Urine NEGATIVE NEG    Ketones, Urine NEGATIVE NEG    Specific Gravity, UA 1.010 1.005 - 1.030    Urine Hgb 3+ (A) NEG    pH, UA 7.0 5.0 - 8.0    Protein, UA 3+ (A) NEG    Urobilinogen, Urine Normal NORM    Nitrite, Urine NEGATIVE NEG    Leukocyte Esterase, Urine 3+ (A) NEG    Urinalysis Comments         Microscopic Urinalysis    Collection Time: 11/11/17  4:17 PM   Result Value Ref Range    -          WBC, UA 10 TO 20 0 /HPF RBC, UA LOADED 0 - 2 /HPF    Casts UA NOT REPORTED /LPF    Crystals UA NOT REPORTED NONE /HPF    Epithelial Cells UA NOT REPORTED /HPF    Renal Epithelial, Urine NOT REPORTED 0 /HPF    Bacteria, UA 1+ (A) NONE    Mucus, UA NOT REPORTED NONE    Trichomonas, UA NOT REPORTED NONE    Amorphous, UA NOT REPORTED NONE    Other Observations UA NOT REPORTED NREQ    Yeast, UA NOT REPORTED NONE   Urine Culture    Collection Time: 11/11/17  4:17 PM   Result Value Ref Range    Specimen Description       . URINE, CATHETERIZED Performed at [address]    Specimen Description  Khushi  [address] [phone]     Special Requests NOT REPORTED     Culture CULTURE IN PROGRESS     Culture       Performed at [facility] [id number] [place] [address], [address] [place] [address] [phone]    Status Pending    Hemoglobin and hematocrit, blood    Collection Time: 11/11/17  6:15 PM   Result Value Ref Range    Hemoglobin 11.5 (L) 12.0 - 16.0 g/dL    Hematocrit 34.8 (L) 36 - 46 %   Glucose, Whole Blood    Collection Time: 11/11/17  9:11 PM   Result Value Ref Range    POC Glucose 292 (H) 65 - 99 mg/dL   Hemoglobin A1c    Collection Time: 11/11/17 10:15 PM   Result Value Ref Range    Hemoglobin A1C 8.1 (H) 4.8 - 5.9 %    Estimated Avg Glucose 186 mg/dL   Hemoglobin and hematocrit, blood    Collection Time: 11/12/17 12:00 AM   Result Value Ref Range    Hemoglobin 10.3 (L) 12.0 - 16.0 g/dL    Hematocrit 31.5 (L) 36 - 46 %   Glucose, Whole Blood    Collection Time: 11/12/17 12:48 AM   Result Value Ref Range    POC Glucose 179 (H) 65 - 99 mg/dL   Basic metabolic panel    Collection Time: 11/12/17  5:53 AM   Result Value Ref Range    Glucose 106 (H) 70 - 99 mg/dL    BUN 21 8 - 23 mg/dL    CREATININE 0.96 (H) 0.50 - 0.90 mg/dL    Bun/Cre Ratio 22 (H) 9 - 20    Calcium 9.3 8.6 - 10.4 mg/dL    Sodium 138 135 - 144 mmol/L    Potassium 4.3 3.7 - 5.3 mmol/L    Chloride 103 98 - 107 mmol/L    CO2 21 20 - 31 mmol/L    Anion Gap 14 9 - 11/12/2017 at 8:28 AM

## 2017-11-13 LAB
ABSOLUTE EOS #: 0.3 K/UL (ref 0–0.4)
ABSOLUTE IMMATURE GRANULOCYTE: ABNORMAL K/UL (ref 0–0.3)
ABSOLUTE LYMPH #: 2.7 K/UL (ref 1–4.8)
ABSOLUTE MONO #: 0.6 K/UL (ref 0–1)
ANION GAP SERPL CALCULATED.3IONS-SCNC: 14 MMOL/L (ref 9–17)
BASOPHILS # BLD: 0 %
BASOPHILS ABSOLUTE: 0 K/UL (ref 0–0.2)
BUN BLDV-MCNC: 13 MG/DL (ref 8–23)
BUN/CREAT BLD: 15 (ref 9–20)
CALCIUM SERPL-MCNC: 9.4 MG/DL (ref 8.6–10.4)
CHLORIDE BLD-SCNC: 105 MMOL/L (ref 98–107)
CO2: 20 MMOL/L (ref 20–31)
CREAT SERPL-MCNC: 0.88 MG/DL (ref 0.5–0.9)
DIFFERENTIAL TYPE: YES
EOSINOPHILS RELATIVE PERCENT: 3 %
GFR AFRICAN AMERICAN: >60 ML/MIN
GFR NON-AFRICAN AMERICAN: >60 ML/MIN
GFR SERPL CREATININE-BSD FRML MDRD: NORMAL ML/MIN/{1.73_M2}
GFR SERPL CREATININE-BSD FRML MDRD: NORMAL ML/MIN/{1.73_M2}
GLUCOSE BLD-MCNC: 127 MG/DL (ref 65–99)
GLUCOSE BLD-MCNC: 162 MG/DL (ref 65–99)
GLUCOSE BLD-MCNC: 171 MG/DL (ref 65–99)
GLUCOSE BLD-MCNC: 185 MG/DL (ref 65–99)
GLUCOSE BLD-MCNC: 62 MG/DL (ref 65–99)
GLUCOSE BLD-MCNC: 71 MG/DL (ref 70–99)
HCT VFR BLD CALC: 32.2 % (ref 36–46)
HEMOGLOBIN: 10.4 G/DL (ref 12–16)
IMMATURE GRANULOCYTES: ABNORMAL %
LYMPHOCYTES # BLD: 33 %
MCH RBC QN AUTO: 30.3 PG (ref 26–34)
MCHC RBC AUTO-ENTMCNC: 32.2 G/DL (ref 31–37)
MCV RBC AUTO: 94.1 FL (ref 80–100)
MONOCYTES # BLD: 7 %
PDW BLD-RTO: 14.8 % (ref 12.1–15.2)
PLATELET # BLD: 328 K/UL (ref 140–450)
PLATELET ESTIMATE: ABNORMAL
PMV BLD AUTO: ABNORMAL FL (ref 6–12)
POTASSIUM SERPL-SCNC: 4 MMOL/L (ref 3.7–5.3)
RBC # BLD: 3.42 M/UL (ref 4–5.2)
RBC # BLD: ABNORMAL 10*6/UL
SEG NEUTROPHILS: 57 %
SEGMENTED NEUTROPHILS ABSOLUTE COUNT: 4.6 K/UL (ref 2.5–7)
SODIUM BLD-SCNC: 139 MMOL/L (ref 135–144)
WBC # BLD: 8.2 K/UL (ref 3.5–11)
WBC # BLD: ABNORMAL 10*3/UL

## 2017-11-13 PROCEDURE — 82947 ASSAY GLUCOSE BLOOD QUANT: CPT

## 2017-11-13 PROCEDURE — 1200000000 HC SEMI PRIVATE

## 2017-11-13 PROCEDURE — 6360000002 HC RX W HCPCS: Performed by: INTERNAL MEDICINE

## 2017-11-13 PROCEDURE — 36415 COLL VENOUS BLD VENIPUNCTURE: CPT

## 2017-11-13 PROCEDURE — 80048 BASIC METABOLIC PNL TOTAL CA: CPT

## 2017-11-13 PROCEDURE — 2580000003 HC RX 258: Performed by: INTERNAL MEDICINE

## 2017-11-13 PROCEDURE — 85025 COMPLETE CBC W/AUTO DIFF WBC: CPT

## 2017-11-13 PROCEDURE — 94761 N-INVAS EAR/PLS OXIMETRY MLT: CPT

## 2017-11-13 PROCEDURE — 6370000000 HC RX 637 (ALT 250 FOR IP): Performed by: INTERNAL MEDICINE

## 2017-11-13 RX ADMIN — CEFTRIAXONE 1 G: 1 INJECTION, POWDER, FOR SOLUTION INTRAMUSCULAR; INTRAVENOUS at 18:39

## 2017-11-13 RX ADMIN — INSULIN GLARGINE 20 UNITS: 100 INJECTION, SOLUTION SUBCUTANEOUS at 20:59

## 2017-11-13 RX ADMIN — SODIUM CHLORIDE: 9 INJECTION, SOLUTION INTRAVENOUS at 14:16

## 2017-11-13 RX ADMIN — BACLOFEN 5 MG: 10 TABLET ORAL at 14:16

## 2017-11-13 RX ADMIN — DOCUSATE SODIUM 100 MG: 100 CAPSULE, LIQUID FILLED ORAL at 20:53

## 2017-11-13 RX ADMIN — FERROUS SULFATE TAB 325 MG (65 MG ELEMENTAL FE) 325 MG: 325 (65 FE) TAB at 09:37

## 2017-11-13 RX ADMIN — BACLOFEN 5 MG: 10 TABLET ORAL at 09:37

## 2017-11-13 RX ADMIN — DONEPEZIL HYDROCHLORIDE 23 MG: 23 TABLET, FILM COATED ORAL at 20:54

## 2017-11-13 RX ADMIN — INSULIN LISPRO 2 UNITS: 100 INJECTION, SOLUTION INTRAVENOUS; SUBCUTANEOUS at 16:47

## 2017-11-13 RX ADMIN — Medication 1 TABLET: at 10:18

## 2017-11-13 RX ADMIN — INSULIN GLARGINE 10 UNITS: 100 INJECTION, SOLUTION SUBCUTANEOUS at 09:38

## 2017-11-13 RX ADMIN — DOCUSATE SODIUM 100 MG: 100 CAPSULE, LIQUID FILLED ORAL at 09:37

## 2017-11-13 RX ADMIN — LEVOTHYROXINE SODIUM 88 MCG: 88 TABLET ORAL at 09:37

## 2017-11-13 RX ADMIN — OXYBUTYNIN CHLORIDE 5 MG: 5 TABLET, EXTENDED RELEASE ORAL at 20:53

## 2017-11-13 RX ADMIN — CALCIUM CARBONATE-VITAMIN D TAB 500 MG-200 UNIT 1 TABLET: 500-200 TAB at 09:37

## 2017-11-13 RX ADMIN — FAMOTIDINE 20 MG: 20 TABLET ORAL at 09:37

## 2017-11-13 RX ADMIN — BACLOFEN 5 MG: 10 TABLET ORAL at 20:52

## 2017-11-13 RX ADMIN — INSULIN LISPRO 2 UNITS: 100 INJECTION, SOLUTION INTRAVENOUS; SUBCUTANEOUS at 11:48

## 2017-11-13 NOTE — PROGRESS NOTES
Pt is a 80year old female admitted for gross hematuria. Pt has advanced dementia. SW contacted [facility]. Pt is a long term resident of [facility]. She has [employer] advantage plan and Medicaid. Her dtr- True Fink is legal guardian. She was in this AM to visit at bedside and stated the D/C plan is for pt to return to [facility] at discharge.     [facility], [address], [facility]  11/13/2017

## 2017-11-13 NOTE — PROGRESS NOTES
Nutrition Assessment    Type and Reason for Visit: Initial    Nutrition Recommendations:   1. Continue current diet. 2. Add 4 oz chocolate glucerna TID with meals. 3. Add sugar-free pudding at HS. Malnutrition Assessment:  · Malnutrition Status: Meets the criteria for moderate malnutrition  · Context: Acute illness or injury  · Findings of the 6 clinical characteristics of malnutrition (Minimum of 2 out of 6 clinical characteristics is required to make the diagnosis of moderate or severe Protein Calorie Malnutrition based on AND/ASPEN Guidelines):  1. Energy Intake-Less than or equal to 50%, greater than 7 days    2. Weight Loss- (4.9% weight loss ),  (3 weeks)  3. Fat Loss-Mild subcutaneous fat loss, Orbital, Fat overlying the ribs  4. Muscle Loss-Mild muscle mass loss, Temples (temporalis muscle)  5. Fluid Accumulation-No significant fluid accumulation,    6.  Strength-Not measured    Nutrition Diagnosis:   · Problem: Moderate malnutrition  · Etiology: related to Insufficient energy/nutrient consumption     Signs and symptoms:  as evidenced by Weight loss, Intake 0-25%    Nutrition Assessment:  · Subjective Assessment: Pt daughter states Pt PO has been down for a long time. She gets a power drink at [employer]. Pt states she likes chocolate. Pt daughter has her dentures at home as Pt tends to lose them. Pt would like the same thing every day: oatmeal with 3 butter, sugar-free syrup,  for lunch and supper: toamto soup, grilled cheese, s-free jello-cherry, milk, and cran juice. Pt does like chocolate ONS and will add to meal trays. NKFA. Difficulty chewing without dentures, but softer foods she ordered for her mother will be fine, per daughter.    · Nutrition-Focused Physical Findings: Pt with evident fat and muscle losses  · Wound Type: None  · Current Nutrition Therapies:  · Oral Diet Orders: Carb Control 4 Carbs/Meal   · Oral Diet intake: 1-25%, Assist  · Oral Nutrition Supplement (ONS) Orders: Coordination of Care (Pt with dementia, lives in [facility])    Nutrition Evaluation:   · Evaluation: Goals set   · Goals: PO > 75% of meals    · Monitoring: Meal Intake, Supplement Intake, Weight, Pertinent Labs      Electronically signed by Juan Torres RD, LD on 11/13/17 at 8:51 AM    Contact Number: [phone]

## 2017-11-13 NOTE — PROGRESS NOTES
Hospitalist Progress Note  11/13/2017 6:05 AM  Subjective:   Admit Date: 11/11/2017  PCP: Eloy Shah MD    Interval History: Maude Thompson has no complaints this am.  Nursing reports that her urine is more \"pink\" in color with sediment. Maude Thompson denies any pain until she is palpated over the suprapubic area and she states that she is tender. No chest pain or SOB. She feels her appetite is good. Labs reviewed this am.  She remains afebrile. Diet: DIET CARB CONTROL; Carb Control: 4 carbs/meal (approximate 1800 kcals/day)  Medications:   Scheduled Meds:   baclofen  5 mg Oral TID    calcium-vitamin D   Oral Daily    Cranberry  1 tablet Oral Daily    docusate sodium  100 mg Oral BID    Donepezil HCl  23 mg Oral Nightly    famotidine  20 mg Oral Daily    ferrous sulfate  325 mg Oral Daily with breakfast    Folinic Acid-Vit B6-Vit B12  1 tablet Oral BID    insulin glargine  20 Units Subcutaneous Nightly    levothyroxine  88 mcg Oral Daily    oxybutynin  5 mg Oral Nightly    sodium chloride flush  10 mL Intravenous 2 times per day    cefTRIAXone (ROCEPHIN) IV  1 g Intravenous Q24H    insulin glargine  10 Units Subcutaneous Daily    insulin lispro  0-12 Units Subcutaneous TID WC    insulin lispro  0-6 Units Subcutaneous Nightly     Continuous Infusions:   sodium chloride 75 mL/hr at 11/12/17 0934    dextrose       CBC:   Recent Labs      11/11/17   1458   11/12/17   0553  11/12/17   1616  11/13/17   0448   WBC  9.8   --    --    --   8.2   HGB  10.9*   < >  9.9*  11.1*  10.4*   PLT  358   --    --    --   328    < > = values in this interval not displayed. BMP:    Recent Labs      11/11/17   1458  11/12/17   0553  11/13/17   0448   NA  133*  138  139   K  5.0  4.3  4.0   CL  97*  103  105   CO2  22  21  20   BUN  28*  21  13   CREATININE  1.26*  0.96*  0.88   GLUCOSE  99  106*  71     Hepatic: No results for input(s): AST, ALT, ALB, BILITOT, ALKPHOS in the last 72 hours.   Troponin: No results for secondary to the need for IV antibiotics, IV hydration, and monitoring of hematuria.       Patient Active Problem List:     COPD (chronic obstructive pulmonary disease) ([facility] [address]     Leukocytosis     Closed fracture of occiput, initial encounter ([facility] [address]     Elevated myoglobin level     Diabetes type 2, controlled ([facility] [address]     Hypothyroidism     Hypertension     Urinary retention     Lower urinary tract infectious disease     Gross hematuria     Right flank pain     Hyperglycemia     Moderate malnutrition ([address]     Gross hematuria      Lossie Leyden Back, MD  Rounding Hospitalist

## 2017-11-13 NOTE — PLAN OF CARE
Problem: Nutrition  Goal: Optimal nutrition therapy  Outcome: Ongoing  Nutrition Problem:  Moderate malnutrition  Intervention: Food and/or Nutrient Delivery: Continue current diet, Start ONS (4 oz chocolate Glucerna TID with meals) add HS snack of sugar-free pudding  Nutritional Goals: PO > 75% of meals

## 2017-11-13 NOTE — PROGRESS NOTES
Agustina BORJA from Dr. Ed Nieto (urology) office calls and is given requested information. Catina Rohith states from her point this pt could return to [address] [place] [address] today with oral antibiotics and the indwelling crooks catheter. Catina Newberry would like to see this patient as an outpatient on Thursday at 2:45 if family is available to be there with the pt. Catina Newberry states if crooks becomes clogged- we may irrigate crooks. Call Agustina at [phone] of crooks stops draining. Dr. Janki Newberry called and made aware of this conversation. Dr. Janki Newberry states he will keep pt over night and re evaluate in the morning.

## 2017-11-13 NOTE — PROGRESS NOTES
Called and spoke with Roselyn Velasquez at Dr. Audrey Braun office to confirm the NP was aware of consult as Dr. Liset Faulkner is out of town. Roselyn Velasquez states she will inform Agustina NP of consult.

## 2017-11-13 NOTE — PLAN OF CARE
Problem: Falls - Risk of  Goal: Absence of falls  Outcome: Met This Shift  No falls this shift. Will continue to monitor. Problem: DAILY CARE  Goal: Daily care needs are met  Outcome: Met This Shift      Problem: PAIN  Goal: Patient's pain/discomfort is manageable  Outcome: Met This Shift  Denies pain - will continue to monitor.

## 2017-11-13 NOTE — CARE COORDINATION
Quality flow rounds held on 11/13/17    Team did not enter room for rounding. Pt is confused, no family present. Plan is to return to [facility]. Carolina Clark is admitted for  <principal problem not specified>. Length of stay 2. Education:    Needed Education:       Do you have any questions regarding your plan of care while at the hospital?     Planned Disposition:               []  Home when able                [] Swing Bed                [] ECF/SNF               [] Other/TBD    Barriers to Discharge:    Can you afford your medications? Do you have transportation to follow up appointments? Do you need any new equipment at home? Current equipment includes       Do you or your family have any questions or concerns we haven't already discussed?

## 2017-11-13 NOTE — PROGRESS NOTES
Pt awake in bed. Assessment and vital signs complete. Pt denies pain and SOB. Pt is alert to person - disoriented to place, time, and situation. Brief is clean and dry. Pt with mushy/red/tender heels bilaterally. Blister to left outer heel and right outer foot. Tidwell draining blood tinged urine with clots. IV fluids infusing as ordered. Bed alarm on for pt safety. Call light in reach.

## 2017-11-14 VITALS
BODY MASS INDEX: 26.22 KG/M2 | HEIGHT: 64 IN | HEART RATE: 77 BPM | DIASTOLIC BLOOD PRESSURE: 56 MMHG | RESPIRATION RATE: 16 BRPM | OXYGEN SATURATION: 96 % | WEIGHT: 153.6 LBS | SYSTOLIC BLOOD PRESSURE: 112 MMHG | TEMPERATURE: 97.9 F

## 2017-11-14 LAB
ABSOLUTE EOS #: 0.2 K/UL (ref 0–0.4)
ABSOLUTE IMMATURE GRANULOCYTE: ABNORMAL K/UL (ref 0–0.3)
ABSOLUTE LYMPH #: 1.9 K/UL (ref 1–4.8)
ABSOLUTE MONO #: 0.4 K/UL (ref 0–1)
BASOPHILS # BLD: 1 %
BASOPHILS ABSOLUTE: 0 K/UL (ref 0–0.2)
DIFFERENTIAL TYPE: YES
EOSINOPHILS RELATIVE PERCENT: 4 %
GLUCOSE BLD-MCNC: 64 MG/DL (ref 65–99)
GLUCOSE BLD-MCNC: 74 MG/DL (ref 65–99)
HCT VFR BLD CALC: 29.8 % (ref 36–46)
HEMOGLOBIN: 9.6 G/DL (ref 12–16)
IMMATURE GRANULOCYTES: ABNORMAL %
LYMPHOCYTES # BLD: 32 %
MCH RBC QN AUTO: 30.4 PG (ref 26–34)
MCHC RBC AUTO-ENTMCNC: 32.3 G/DL (ref 31–37)
MCV RBC AUTO: 94.1 FL (ref 80–100)
MONOCYTES # BLD: 8 %
PDW BLD-RTO: 14.9 % (ref 12.1–15.2)
PLATELET # BLD: 300 K/UL (ref 140–450)
PLATELET ESTIMATE: ABNORMAL
PMV BLD AUTO: ABNORMAL FL (ref 6–12)
RBC # BLD: 3.17 M/UL (ref 4–5.2)
RBC # BLD: ABNORMAL 10*6/UL
SEG NEUTROPHILS: 55 %
SEGMENTED NEUTROPHILS ABSOLUTE COUNT: 3.3 K/UL (ref 2.5–7)
WBC # BLD: 5.9 K/UL (ref 3.5–11)
WBC # BLD: ABNORMAL 10*3/UL

## 2017-11-14 PROCEDURE — 82947 ASSAY GLUCOSE BLOOD QUANT: CPT

## 2017-11-14 PROCEDURE — 36415 COLL VENOUS BLD VENIPUNCTURE: CPT

## 2017-11-14 PROCEDURE — 6370000000 HC RX 637 (ALT 250 FOR IP): Performed by: INTERNAL MEDICINE

## 2017-11-14 PROCEDURE — 94761 N-INVAS EAR/PLS OXIMETRY MLT: CPT

## 2017-11-14 PROCEDURE — 2580000003 HC RX 258: Performed by: INTERNAL MEDICINE

## 2017-11-14 PROCEDURE — 85025 COMPLETE CBC W/AUTO DIFF WBC: CPT

## 2017-11-14 RX ORDER — CEPHALEXIN 250 MG/1
250 CAPSULE ORAL EVERY 8 HOURS SCHEDULED
Status: DISCONTINUED | OUTPATIENT
Start: 2017-11-14 | End: 2017-11-14 | Stop reason: HOSPADM

## 2017-11-14 RX ORDER — CEPHALEXIN 250 MG/1
250 CAPSULE ORAL 3 TIMES DAILY
Qty: 21 CAPSULE | Refills: 0
Start: 2017-11-14

## 2017-11-14 RX ADMIN — Medication 1 TABLET: at 08:47

## 2017-11-14 RX ADMIN — DOCUSATE SODIUM 100 MG: 100 CAPSULE, LIQUID FILLED ORAL at 08:46

## 2017-11-14 RX ADMIN — LEVOTHYROXINE SODIUM 88 MCG: 88 TABLET ORAL at 08:47

## 2017-11-14 RX ADMIN — FAMOTIDINE 20 MG: 20 TABLET ORAL at 08:47

## 2017-11-14 RX ADMIN — SODIUM CHLORIDE: 9 INJECTION, SOLUTION INTRAVENOUS at 03:02

## 2017-11-14 RX ADMIN — INSULIN GLARGINE 10 UNITS: 100 INJECTION, SOLUTION SUBCUTANEOUS at 09:03

## 2017-11-14 RX ADMIN — CALCIUM CARBONATE-VITAMIN D TAB 500 MG-200 UNIT 1 TABLET: 500-200 TAB at 08:45

## 2017-11-14 RX ADMIN — BACLOFEN 5 MG: 10 TABLET ORAL at 08:45

## 2017-11-14 RX ADMIN — FERROUS SULFATE TAB 325 MG (65 MG ELEMENTAL FE) 325 MG: 325 (65 FE) TAB at 08:45

## 2017-11-14 RX ADMIN — CEPHALEXIN 250 MG: 250 CAPSULE ORAL at 08:45

## 2017-11-14 ASSESSMENT — PAIN SCALES - GENERAL: PAINLEVEL_OUTOF10: 0

## 2017-11-14 NOTE — PROGRESS NOTES
FSBS 64, given four ounces of orange juice. FSBS Recheck 15 minutes later, 74. Given breakfast tray.

## 2017-11-14 NOTE — PROGRESS NOTES
Hospitalist Progress Note  11/14/2017 5:28 AM  Subjective:   Admit Date: 11/11/2017  PCP: Alondra Porter MD    Interval History: Hannah Sánchez has no complaints this morning. The pain in the suprapubic area has resolved. The blood in her urine has cleared. No chest pain or SOB. Appetite is \"good\" with no nausea. She has an appointment with Urology on Thursday for follow up. Diet: DIET CARB CONTROL; Carb Control: 4 carbs/meal (approximate 1800 kcals/day)  Dietary Nutrition Supplements: Diabetic Oral Supplement  Dietary Nutrition Supplements: Snack (see comment)  Medications:   Scheduled Meds:   baclofen  5 mg Oral TID    calcium-vitamin D   Oral Daily    Cranberry  1 tablet Oral Daily    docusate sodium  100 mg Oral BID    Donepezil HCl  23 mg Oral Nightly    famotidine  20 mg Oral Daily    ferrous sulfate  325 mg Oral Daily with breakfast    Folinic Acid-Vit B6-Vit B12  1 tablet Oral BID    insulin glargine  20 Units Subcutaneous Nightly    levothyroxine  88 mcg Oral Daily    oxybutynin  5 mg Oral Nightly    sodium chloride flush  10 mL Intravenous 2 times per day    cefTRIAXone (ROCEPHIN) IV  1 g Intravenous Q24H    insulin glargine  10 Units Subcutaneous Daily    insulin lispro  0-12 Units Subcutaneous TID WC    insulin lispro  0-6 Units Subcutaneous Nightly     Continuous Infusions:   sodium chloride 75 mL/hr at 11/14/17 0302    dextrose       CBC:   Recent Labs      11/11/17   1458   11/12/17   1616  11/13/17   0448  11/14/17   0452   WBC  9.8   --    --   8.2  5.9   HGB  10.9*   < >  11.1*  10.4*  9.6*   PLT  358   --    --   328  300    < > = values in this interval not displayed.      BMP:    Recent Labs      11/11/17   1458  11/12/17   0553  11/13/17   0448   NA  133*  138  139   K  5.0  4.3  4.0   CL  97*  103  105   CO2  22  21  20   BUN  28*  21  13   CREATININE  1.26*  0.96*  0.88   GLUCOSE  99  106*  71     Hepatic: No results for input(s): AST, ALT, ALB, BILITOT, ALKPHOS in the last 72 hours. Troponin: No results for input(s): TROPONINI in the last 72 hours. BNP: No results for input(s): BNP in the last 72 hours. Lipids: No results for input(s): CHOL, HDL in the last 72 hours. Invalid input(s): LDLCALCU  INR: No results for input(s): INR in the last 72 hours. Objective:   Vitals: /64   Pulse 77   Temp 98.4 °F (36.9 °C) (Oral)   Resp 18   Ht 5' 4\" (1.626 m)   Wt 153 lb 7 oz (69.6 kg)   SpO2 98%   BMI 26.34 kg/m²   General appearance: alert and cooperative with exam  HEENT: Head: Normocephalic, no lesions, without obvious abnormality. Eye: Normal external eye, conjunctiva, lids cornea, NAOMI. Nose: Normal external nose, mucus membranes and septum. Neck: no adenopathy and supple, symmetrical, trachea midline  Lungs: clear to auscultation bilaterally  Heart: regular rate and rhythm, S1, S2 normal and systolic murmur. Abdomen: soft, non-tender; bowel sounds normal; no masses,  no organomegaly  Extremities: extremities normal, atraumatic, no cyanosis or edema and SCDs are on. Neurologic: Mental status: Alert, answering questions appropriately. Assessment and Plan:   1. Hematuria - Resolved. 2. UTI - on IV Rocephin, urine culture showing no growth. WBC normal, no temp. 3. Acute renal failure - resolved with IV fluids  4.  Acute on chronic anemia -secondary to hematuria. Hgb 9.6 this am.  5. DM type 2 - insulin-dependent, continue Lantus and sliding scale insulin  6.  Chronic atrial fibrillation - not a candidate for anticoagulation due to high risk for falls  7.  Hypertension - BP improved with holding amlodipine. 8.  Hypothyroidism - on Levothyroxine. 9.  Dementia - on Aricept and Namenda, DNR CC  10.  History of fall with skull fracture in 2013    Plan:  1. Discharge back to ECF today on oral Keflex. 2.  Keep crooks in until she sees Urology on Thursday.           Patient Active Problem List:     COPD (chronic obstructive pulmonary disease) ([facility] [address]

## 2017-11-14 NOTE — PROGRESS NOTES
Checked on patient. O2 saturation 96% on RA. BS Clear. Patient denies any SOB at this time. Will continue to monitor patient.

## 2017-11-16 ENCOUNTER — OFFICE VISIT (OUTPATIENT)
Dept: UROLOGY | Age: 82
End: 2017-11-16
Payer: MEDICARE

## 2017-11-16 VITALS — BODY MASS INDEX: 26.26 KG/M2 | DIASTOLIC BLOOD PRESSURE: 60 MMHG | WEIGHT: 153 LBS | SYSTOLIC BLOOD PRESSURE: 94 MMHG

## 2017-11-16 DIAGNOSIS — R31.0 GROSS HEMATURIA: Primary | ICD-10-CM

## 2017-11-16 DIAGNOSIS — N28.89 RENAL MASS: ICD-10-CM

## 2017-11-16 PROCEDURE — 99204 OFFICE O/P NEW MOD 45 MIN: CPT | Performed by: UROLOGY

## 2017-11-16 NOTE — PROGRESS NOTES
Subjective:      Patient ID: Sedrick Rodriguez is a 80 y.o. female. HPI  Pt referred for one day episode of gross hematuria end of Jan 2014. Has not had any since. Negative urine cx. No recent CT abd/pelvis, Crt normal. Was never a smoker, was exposed to second-hand smoke (). Does have some mild intermittent right flank pain for the past week or two. No hx stones. Denies dysuria, fever. Does admit to some urge incontinence at [facility] - states that she feels urge to void, presses button for help, has to wait 30-45 mins for someone to help her to the bathroom and then has incontinence. Also complains that they wake her up at night to void. Currently  Patient here today after having gross hematuria in the hospital.  Patient was seen approximately 3 years ago. At that point time we saw the patient and ordered a CT urogram.  We had her scheduled for cystoscopy and to discuss her CT urogram.  Patient or facility that she was staying cancel the appointment. Patient was never given her CT scan results. 3 years ago she did have a 2.4 cm lesion in her left kidney. This also showed a trabeculated bladder with diverticulum. Pt did have a crooks catherter placed in the hospital. This has been draining clear since discharge.      Past Medical History:   Diagnosis Date    Anemia     COPD (chronic obstructive pulmonary disease) ([address]     Dementia     Depression     Diabetes mellitus (HCC)     with neuropathy    Elevated myoglobin level     Hyperlipidemia     Hypertension     Leukocytosis     Occipital fracture ([address] 06/2013    Skull fracture (HCC)     Thyroid disease     UTI (urinary tract infection)      Past Surgical History:   Procedure Laterality Date    HYSTERECTOMY      JOINT REPLACEMENT Left     knee    KNEE SURGERY      LUNG REMOVAL, PARTIAL       Outpatient Encounter Prescriptions as of 11/16/2017   Medication Sig Dispense Refill    NONFORMULARY Apply 1-2 Squirts topically 4 times daily as needed (pain) Clean area with alcohol then apply to affected area, massaging into skin. Wait 10 minutes between applications.       cephALEXin (KEFLEX) 500 MG capsule Take 500 mg by mouth daily      Cranberry 450 MG TABS Take 1 tablet by mouth daily      insulin glargine (LANTUS) 100 UNIT/ML injection vial Inject 20 Units into the skin nightly      insulin glargine (LANTUS) 100 UNIT/ML injection vial Inject 10 Units into the skin every morning      simethicone (MYLICON) 80 MG chewable tablet Take 80 mg by mouth 2 times daily as needed for Flatulence      bisacodyl (DULCOLAX) 10 MG suppository Place 10 mg rectally daily as needed for Constipation      oxybutynin (DITROPAN-XL) 5 MG extended release tablet Take 5 mg by mouth nightly      magnesium hydroxide (MILK OF MAGNESIA) 400 MG/5ML suspension Take 30 mLs by mouth daily as needed for Constipation      acetaminophen (TYLENOL) 500 MG tablet Take 1,000 mg by mouth every 8 hours as needed for Pain      Sodium Phosphates (FLEET) 7-19 GM/118ML Place 1 enema rectally daily as needed      Dextrose, Diabetic Use, (GLUTOSE 15 PO) Take 1 Tube by mouth as needed      amLODIPine (NORVASC) 5 MG tablet Take 5 mg by mouth daily      Folinic Acid-Vit B6-Vit B12 (FOLINIC-PLUS PO) Take 1 tablet by mouth 2 times daily      lisinopril (PRINIVIL;ZESTRIL) 10 MG tablet Take 10 mg by mouth daily      Calcium Carb-Cholecalciferol (CALCIUM-VITAMIN D) 600-400 MG-UNIT TABS Take 1 tablet by mouth daily      insulin aspart (NOVOLOG) 100 UNIT/ML injection vial Inject 5 Units into the skin 3 times daily (before meals)       albuterol (PROVENTIL) (2.5 MG/3ML) 0.083% nebulizer solution Take 2.5 mg by nebulization every 6 hours as needed for Wheezing      docusate sodium (COLACE) 100 MG capsule Take 100 mg by mouth 2 times daily       Donepezil HCl (ARICEPT) 23 MG TABS tablet Take 23 mg by mouth nightly      ferrous sulfate 325 (65 FE) MG tablet Take 325 mg by mouth daily (with MAGNESIA) 400 MG/5ML suspension Take 30 mLs by mouth daily as needed for Constipation      acetaminophen (TYLENOL) 500 MG tablet Take 1,000 mg by mouth every 8 hours as needed for Pain      Sodium Phosphates (FLEET) 7-19 GM/118ML Place 1 enema rectally daily as needed      Dextrose, Diabetic Use, (GLUTOSE 15 PO) Take 1 Tube by mouth as needed      amLODIPine (NORVASC) 5 MG tablet Take 5 mg by mouth daily      Folinic Acid-Vit B6-Vit B12 (FOLINIC-PLUS PO) Take 1 tablet by mouth 2 times daily      lisinopril (PRINIVIL;ZESTRIL) 10 MG tablet Take 10 mg by mouth daily      Calcium Carb-Cholecalciferol (CALCIUM-VITAMIN D) 600-400 MG-UNIT TABS Take 1 tablet by mouth daily      insulin aspart (NOVOLOG) 100 UNIT/ML injection vial Inject 5 Units into the skin 3 times daily (before meals)       albuterol (PROVENTIL) (2.5 MG/3ML) 0.083% nebulizer solution Take 2.5 mg by nebulization every 6 hours as needed for Wheezing      docusate sodium (COLACE) 100 MG capsule Take 100 mg by mouth 2 times daily       Donepezil HCl (ARICEPT) 23 MG TABS tablet Take 23 mg by mouth nightly      ferrous sulfate 325 (65 FE) MG tablet Take 325 mg by mouth daily (with breakfast)      aspirin 81 MG tablet Take 81 mg by mouth daily      potassium chloride (MICRO-K) 10 MEQ CR capsule   Take 20 mEq by mouth 2 times daily       famotidine (PEPCID) 20 MG tablet Take 20 mg by mouth daily       levothyroxine (SYNTHROID) 88 MCG tablet Take 88 mcg by mouth daily       cephALEXin (KEFLEX) 250 MG capsule Take 1 capsule by mouth 3 times daily 21 capsule 0    conjugated estrogens (PREMARIN) 0.625 MG/GM vaginal cream Place vaginally daily.  1 Tube 3    hydrOXYzine (ATARAX) 25 MG tablet Take 25 mg by mouth every 6 hours as needed for Itching      glucagon, rDNA, 1 MG injection Inject 1 mg into the muscle as needed for Low blood sugar      guaiFENesin (ROBITUSSIN) 100 MG/5ML SOLN oral solution Take 10 mLs by mouth every 4 hours as needed for Cough      baclofen (LIORESAL) 10 MG tablet Take 5 mg by mouth 3 times daily      furosemide (LASIX) 20 MG tablet Take 20 mg by mouth 2 times daily        No current facility-administered medications on file prior to visit. Pcn [penicillins] and Sulfa antibiotics  History reviewed. No pertinent family history. History   Smoking Status    Never Smoker   Smokeless Tobacco    Never Used       History   Alcohol Use No           PHYSICAL EXAM:  Constitutional: Patient resting comfortably, in no acute distress. Neuro: Alert and oriented to person place and time. Cranial nerves grossly intact. Psych: Mood and affect normal.  Skin: Warm, dry  HEENT: normocephalic, atraumatic  Lymphatics: No palpable lymphadenopathy  Lungs: Respiratory effort normal, unlabored  Cardiovascular:  Normal peripheral pulses  Abdomen: Soft, non-tender, non-distended with no organomegaly or palpable masses. : No CVA tenderness. Bladder non-tender and not distended. Pelvic: deferred    Lab Results   Component Value Date    BUN 13 11/13/2017     Lab Results   Component Value Date    CREATININE 0.88 11/13/2017       Review of Systems   Constitutional: Negative for appetite change, chills and fever. Eyes: Negative for pain, redness and visual disturbance. Respiratory: Negative for cough, shortness of breath and wheezing. Cardiovascular: Negative for chest pain and leg swelling. Gastrointestinal: Negative for abdominal pain, nausea and vomiting. Genitourinary: Negative for difficulty urinating, dysuria, flank pain, frequency, hematuria, pelvic pain, vaginal bleeding and vaginal discharge. Musculoskeletal: Negative for back pain, joint swelling and myalgias. Skin: Negative for color change, rash and wound. Neurological: Negative for dizziness, tremors and numbness. Hematological: Negative for adenopathy. Does not bruise/bleed easily. Objective:   Physical Exam    Assessment:      This is a 80 y.o. female with the following diagnoses:  1. Gross hematuria     2. Renal mass           Plan:      We did remove pt catheter today. I did offer a CT scan today but pt daughter is unsure if they would like to pursue this as she is DNR. They will f/u in 4 weeks with a PVR. They will decide when they return if they would like The CT scan. We can order before hand if they desire.

## 2017-11-17 PROBLEM — N28.89 RENAL MASS: Status: ACTIVE | Noted: 2017-11-17

## 2017-11-17 ASSESSMENT — ENCOUNTER SYMPTOMS
SHORTNESS OF BREATH: 0
WHEEZING: 0
COLOR CHANGE: 0
EYE REDNESS: 0
VOMITING: 0
BACK PAIN: 0
NAUSEA: 0
COUGH: 0
ABDOMINAL PAIN: 0
EYE PAIN: 0